# Patient Record
Sex: FEMALE | Race: WHITE | NOT HISPANIC OR LATINO | Employment: FULL TIME
[De-identification: names, ages, dates, MRNs, and addresses within clinical notes are randomized per-mention and may not be internally consistent; named-entity substitution may affect disease eponyms.]

---

## 2018-08-31 ENCOUNTER — TRANSCRIBE ORDERS (OUTPATIENT)
Dept: SCHEDULING | Age: 58
End: 2018-08-31

## 2018-08-31 DIAGNOSIS — Z12.31 ENCOUNTER FOR SCREENING MAMMOGRAM FOR MALIGNANT NEOPLASM OF BREAST: Primary | ICD-10-CM

## 2018-09-06 ENCOUNTER — HOSPITAL ENCOUNTER (OUTPATIENT)
Dept: RADIOLOGY | Age: 58
Discharge: HOME | End: 2018-09-06
Attending: OBSTETRICS & GYNECOLOGY
Payer: COMMERCIAL

## 2018-09-06 DIAGNOSIS — Z12.31 ENCOUNTER FOR SCREENING MAMMOGRAM FOR MALIGNANT NEOPLASM OF BREAST: ICD-10-CM

## 2018-09-06 PROCEDURE — 77063 BREAST TOMOSYNTHESIS BI: CPT

## 2019-08-07 ENCOUNTER — TRANSCRIBE ORDERS (OUTPATIENT)
Dept: SCHEDULING | Age: 59
End: 2019-08-07

## 2019-08-07 DIAGNOSIS — Z13.820 ENCOUNTER FOR SCREENING FOR OSTEOPOROSIS: Primary | ICD-10-CM

## 2019-08-09 ENCOUNTER — TRANSCRIBE ORDERS (OUTPATIENT)
Dept: SCHEDULING | Age: 59
End: 2019-08-09

## 2019-08-09 DIAGNOSIS — Z12.31 ENCOUNTER FOR SCREENING MAMMOGRAM FOR MALIGNANT NEOPLASM OF BREAST: Primary | ICD-10-CM

## 2019-09-09 ENCOUNTER — HOSPITAL ENCOUNTER (OUTPATIENT)
Dept: RADIOLOGY | Age: 59
Discharge: HOME | End: 2019-09-09
Attending: FAMILY MEDICINE
Payer: COMMERCIAL

## 2019-09-09 ENCOUNTER — HOSPITAL ENCOUNTER (OUTPATIENT)
Dept: RADIOLOGY | Age: 59
Discharge: HOME | End: 2019-09-09
Attending: OBSTETRICS & GYNECOLOGY
Payer: COMMERCIAL

## 2019-09-09 DIAGNOSIS — Z12.31 ENCOUNTER FOR SCREENING MAMMOGRAM FOR MALIGNANT NEOPLASM OF BREAST: ICD-10-CM

## 2019-09-09 DIAGNOSIS — Z13.820 ENCOUNTER FOR SCREENING FOR OSTEOPOROSIS: ICD-10-CM

## 2019-09-09 PROCEDURE — 77080 DXA BONE DENSITY AXIAL: CPT

## 2019-09-09 PROCEDURE — 77067 SCR MAMMO BI INCL CAD: CPT

## 2021-12-23 ENCOUNTER — HOSPITAL ENCOUNTER (OUTPATIENT)
Dept: RADIOLOGY | Age: 61
Discharge: HOME | End: 2021-12-23
Attending: NURSE PRACTITIONER
Payer: COMMERCIAL

## 2021-12-23 ENCOUNTER — TRANSCRIBE ORDERS (OUTPATIENT)
Dept: REGISTRATION | Age: 61
End: 2021-12-23

## 2021-12-23 DIAGNOSIS — Z12.39 ENCOUNTER FOR OTHER SCREENING FOR MALIGNANT NEOPLASM OF BREAST: ICD-10-CM

## 2021-12-23 DIAGNOSIS — Z12.39 ENCOUNTER FOR OTHER SCREENING FOR MALIGNANT NEOPLASM OF BREAST: Primary | ICD-10-CM

## 2021-12-23 PROCEDURE — 77067 SCR MAMMO BI INCL CAD: CPT

## 2021-12-27 ENCOUNTER — TRANSCRIBE ORDERS (OUTPATIENT)
Dept: REGISTRATION | Age: 61
End: 2021-12-27

## 2021-12-27 DIAGNOSIS — R92.8 OTHER ABNORMAL AND INCONCLUSIVE FINDINGS ON DIAGNOSTIC IMAGING OF BREAST: Primary | ICD-10-CM

## 2022-01-14 ENCOUNTER — HOSPITAL ENCOUNTER (OUTPATIENT)
Dept: RADIOLOGY | Age: 62
Discharge: HOME | End: 2022-01-14
Attending: STUDENT IN AN ORGANIZED HEALTH CARE EDUCATION/TRAINING PROGRAM
Payer: COMMERCIAL

## 2022-01-14 DIAGNOSIS — R92.8 OTHER ABNORMAL AND INCONCLUSIVE FINDINGS ON DIAGNOSTIC IMAGING OF BREAST: ICD-10-CM

## 2022-01-14 PROCEDURE — 77065 DX MAMMO INCL CAD UNI: CPT | Mod: RT

## 2022-01-14 PROCEDURE — 76642 ULTRASOUND BREAST LIMITED: CPT | Mod: LT

## 2023-03-08 ENCOUNTER — HOSPITAL ENCOUNTER (OUTPATIENT)
Dept: RADIOLOGY | Facility: CLINIC | Age: 63
Discharge: HOME | End: 2023-03-08
Attending: NURSE PRACTITIONER
Payer: COMMERCIAL

## 2023-03-08 ENCOUNTER — TRANSCRIBE ORDERS (OUTPATIENT)
Dept: REGISTRATION | Facility: CLINIC | Age: 63
End: 2023-03-08

## 2023-03-08 DIAGNOSIS — Z00.00 ENCOUNTER FOR GENERAL ADULT MEDICAL EXAMINATION WITHOUT ABNORMAL FINDINGS: ICD-10-CM

## 2023-03-08 DIAGNOSIS — Z12.39 ENCOUNTER FOR OTHER SCREENING FOR MALIGNANT NEOPLASM OF BREAST: ICD-10-CM

## 2023-03-08 DIAGNOSIS — Z12.39 ENCOUNTER FOR OTHER SCREENING FOR MALIGNANT NEOPLASM OF BREAST: Primary | ICD-10-CM

## 2023-03-08 DIAGNOSIS — N95.1 MENOPAUSAL AND FEMALE CLIMACTERIC STATES: ICD-10-CM

## 2023-03-08 PROCEDURE — 77063 BREAST TOMOSYNTHESIS BI: CPT

## 2023-03-08 PROCEDURE — 77067 SCR MAMMO BI INCL CAD: CPT

## 2023-03-08 PROCEDURE — 77080 DXA BONE DENSITY AXIAL: CPT

## 2023-03-09 ENCOUNTER — TRANSCRIBE ORDERS (OUTPATIENT)
Dept: SCHEDULING | Age: 63
End: 2023-03-09

## 2023-03-09 DIAGNOSIS — R79.89 OTHER SPECIFIED ABNORMAL FINDINGS OF BLOOD CHEMISTRY: Primary | ICD-10-CM

## 2023-03-16 ENCOUNTER — HOSPITAL ENCOUNTER (OUTPATIENT)
Dept: RADIOLOGY | Facility: CLINIC | Age: 63
Discharge: HOME | End: 2023-03-16
Attending: NURSE PRACTITIONER
Payer: COMMERCIAL

## 2023-03-16 DIAGNOSIS — R79.89 OTHER SPECIFIED ABNORMAL FINDINGS OF BLOOD CHEMISTRY: ICD-10-CM

## 2023-03-16 PROCEDURE — 76700 US EXAM ABDOM COMPLETE: CPT

## 2023-04-04 ENCOUNTER — APPOINTMENT (RX ONLY)
Dept: URBAN - METROPOLITAN AREA CLINIC 374 | Facility: CLINIC | Age: 63
Setting detail: DERMATOLOGY
End: 2023-04-04

## 2023-04-04 DIAGNOSIS — B07.8 OTHER VIRAL WARTS: ICD-10-CM

## 2023-04-04 PROCEDURE — ? BENIGN DESTRUCTION

## 2023-04-04 PROCEDURE — 17110 DESTRUCTION B9 LES UP TO 14: CPT

## 2023-04-04 PROCEDURE — ? COUNSELING

## 2023-04-04 ASSESSMENT — LOCATION SIMPLE DESCRIPTION DERM
LOCATION SIMPLE: LEFT FOREHEAD
LOCATION SIMPLE: NOSE

## 2023-04-04 ASSESSMENT — LOCATION DETAILED DESCRIPTION DERM
LOCATION DETAILED: LEFT MEDIAL FOREHEAD
LOCATION DETAILED: NASAL DORSUM

## 2023-04-04 ASSESSMENT — LOCATION ZONE DERM
LOCATION ZONE: FACE
LOCATION ZONE: NOSE

## 2023-04-04 NOTE — PROCEDURE: BENIGN DESTRUCTION
Add 52 Modifier (Optional): no
Detail Level: Detailed
Treatment Number (Will Not Render If 0): 1
Anesthesia Volume In Cc: 0
Number Of Freeze-Thaw Cycles: 3 freeze-thaw cycles
Medical Necessity Clause: This procedure was medically necessary because the lesions that were treated were:
Post-Care Instructions: I reviewed with the patient in detail post-care instructions. Patient is to wear sunprotection, and avoid picking at any of the treated lesions. Pt may apply Vaseline to crusted or scabbing areas.
Medical Necessity Information: It is in your best interest to select a reason for this procedure from the list below. All of these items fulfill various CMS LCD requirements except the new and changing color options.
Consent: The patient's verbal consent was obtained including but not limited to risks of crusting, scabbing, blistering, scarring, darker or lighter pigmentary change, recurrence, incomplete removal and infection.

## 2023-04-21 ENCOUNTER — TRANSCRIBE ORDERS (OUTPATIENT)
Dept: SCHEDULING | Age: 63
End: 2023-04-21

## 2023-04-21 DIAGNOSIS — M23.92 UNSPECIFIED INTERNAL DERANGEMENT OF LEFT KNEE: Primary | ICD-10-CM

## 2023-04-22 ENCOUNTER — HOSPITAL ENCOUNTER (OUTPATIENT)
Dept: RADIOLOGY | Age: 63
Discharge: HOME | End: 2023-04-22
Attending: ORTHOPAEDIC SURGERY
Payer: COMMERCIAL

## 2023-04-22 DIAGNOSIS — M23.92 UNSPECIFIED INTERNAL DERANGEMENT OF LEFT KNEE: ICD-10-CM

## 2024-05-03 ENCOUNTER — PRE-ADMISSION TESTING (OUTPATIENT)
Dept: PREADMISSION TESTING | Age: 64
End: 2024-05-03
Payer: COMMERCIAL

## 2024-05-03 VITALS — HEIGHT: 65 IN | WEIGHT: 188 LBS | BODY MASS INDEX: 31.32 KG/M2

## 2024-05-03 RX ORDER — ZOLPIDEM TARTRATE 10 MG/1
10 TABLET ORAL NIGHTLY
Status: ON HOLD | COMMUNITY
End: 2024-05-16

## 2024-05-03 RX ORDER — TIRZEPATIDE 7.5 MG/.5ML
INJECTION, SOLUTION SUBCUTANEOUS
COMMUNITY
Start: 2024-04-30

## 2024-05-03 RX ORDER — CHOLECALCIFEROL (VITAMIN D3) 50 MCG
2000 TABLET ORAL EVERY EVENING
COMMUNITY

## 2024-05-03 RX ORDER — ROSUVASTATIN CALCIUM 10 MG/1
10 TABLET, COATED ORAL NIGHTLY
COMMUNITY

## 2024-05-03 NOTE — PRE-PROCEDURE INSTRUCTIONS
1.       Admissions will call you with your arrival time on  May 14, 2024 (day prior to surgery) between 2pm-4pm.  For questions about your arrival time, please call [phone].    2.       On the day of your procedure please report to the [facility] in the [place]. Please arrive through the [facility].  If you are parking in the [facility], come to the ground floor of the garage and follow signs to the [facility].  After being screened, please report to either the Outpatient Registration for Pre-Admission Testing or to the Surgery Registration Desk.    3. Please follow the following fasting guidelines:     No solid food EIGHT HOURS prior to arrival time on day of surgery.  6 ounces of clear liquids, meaning water or PLAIN black coffee WITHOUT any milk, cream, sugar, or sweetener are permitted up to TWO HOURS prior to arrival at the hospital.    4. Please take ONLY the following medications with a sip of water on the morning of your procedure:   none    5. Other Instructions: You may brush your teeth the morning of the procedure. Rinse and spit, do not swallow.  Bring a list of your medications with dosages.  Use surgical wash as directed.     6. If you develop a cold, cough, fever, rash, or other symptom prior to the day of the procedure, please report it to your physician immediately.    7. If you need to cancel the procedure for any reason, please contact your physician.    8. Make arrangements to have safe transportation home accompanied by a responsible adult. If you have not arranged safe transportation home, your surgery will be cancelled. Safe transportation may include private vehicle, ride-share service, taxi and public transportation when accompanied by a responsible adult who will assist you home. A responsible adult is someone known to you and does not include the taxi, ride-share or public transit drive transporting you.    9.  If it is medically necessary  for you to have a longer stay, you will be informed as soon as the decision is made.    10. Only bring essential items to the hospital.  Do not wear or bring anything of value to the hospital including jewelry of any kind, money, or wallet. Do not wear make-up or contact lenses.  DO NOT BRING MEDICATIONS FROM HOME unless instructed to do so. DO bring your hearing aids, glasses, and a case    11. No lotion, creams, powders, or oils on skin the morning of procedure     12. Dress in comfortable clothes.    13.  If instructed, please bring a copy of your Advanced Directive (Living Will/Durable Power of ) on the day of your procedure.     14. Ensuring your safety at all times is a very important part of our [facility] Culture of Safety. After having surgery and sedation, you are at risk for falling and balance issues. Although you may feel awake, the effects of the medication can last up to 24 hours after anesthesia. If you need to use the bathroom during your recovery period, nursing staff will escort you there and stay with you to ensure your safety.    15. Refrain from drinking alcohol and smoking cigarettes for 24 hours prior to surgery.    16. Shower with antibacterial soap (Dial) the night before and morning of your procedure.  If your procedure indicates the need for CHG antiseptic wash (Bactoshield or Hibiclens), please use this instead and follow instructions as discussed at the time of your Pre-Admission Testing phone interview or visit.    Above instructions reviewed with patient and patient acknowledges understanding.    Form explained by: Pratima Pike RN

## 2024-05-06 ENCOUNTER — TRANSCRIBE ORDERS (OUTPATIENT)
Dept: PREADMISSION TESTING | Facility: HOSPITAL | Age: 64
End: 2024-05-06

## 2024-05-06 DIAGNOSIS — M17.12 UNILATERAL PRIMARY OSTEOARTHRITIS, LEFT KNEE: Primary | ICD-10-CM

## 2024-05-07 ENCOUNTER — HOSPITAL ENCOUNTER (OUTPATIENT)
Dept: CARDIOLOGY | Facility: HOSPITAL | Age: 64
Discharge: HOME | End: 2024-05-07
Attending: ORTHOPAEDIC SURGERY
Payer: COMMERCIAL

## 2024-05-07 ENCOUNTER — PRE-ADMISSION TESTING (OUTPATIENT)
Dept: PREADMISSION TESTING | Facility: HOSPITAL | Age: 64
End: 2024-05-07
Attending: ORTHOPAEDIC SURGERY
Payer: COMMERCIAL

## 2024-05-07 VITALS
RESPIRATION RATE: 16 BRPM | DIASTOLIC BLOOD PRESSURE: 84 MMHG | WEIGHT: 184.4 LBS | OXYGEN SATURATION: 97 % | HEART RATE: 72 BPM | SYSTOLIC BLOOD PRESSURE: 126 MMHG | TEMPERATURE: 97.7 F | HEIGHT: 65 IN | BODY MASS INDEX: 30.72 KG/M2

## 2024-05-07 DIAGNOSIS — M17.12 UNILATERAL PRIMARY OSTEOARTHRITIS, LEFT KNEE: ICD-10-CM

## 2024-05-07 DIAGNOSIS — E78.5 HYPERLIPIDEMIA, UNSPECIFIED HYPERLIPIDEMIA TYPE: ICD-10-CM

## 2024-05-07 DIAGNOSIS — Z01.818 ENCOUNTER FOR PRE-OPERATIVE EXAMINATION: Primary | ICD-10-CM

## 2024-05-07 DIAGNOSIS — R63.4 WEIGHT LOSS: ICD-10-CM

## 2024-05-07 DIAGNOSIS — M17.12 PRIMARY OSTEOARTHRITIS OF LEFT KNEE: ICD-10-CM

## 2024-05-07 LAB
ABO + RH BLD: NORMAL
ANION GAP SERPL CALC-SCNC: 8 MEQ/L (ref 3–15)
BLD GP AB SCN SERPL QL: NEGATIVE
BLOOD BANK CMNT PATIENT-IMP: NORMAL
BUN SERPL-MCNC: 17 MG/DL (ref 7–25)
CALCIUM SERPL-MCNC: 9.8 MG/DL (ref 8.6–10.3)
CHLORIDE SERPL-SCNC: 104 MEQ/L (ref 98–107)
CO2 SERPL-SCNC: 27 MEQ/L (ref 21–31)
CREAT SERPL-MCNC: 0.8 MG/DL (ref 0.6–1.2)
D AG BLD QL: POSITIVE
EGFRCR SERPLBLD CKD-EPI 2021: >60 ML/MIN/1.73M*2
ERYTHROCYTE [DISTWIDTH] IN BLOOD BY AUTOMATED COUNT: 11.7 % (ref 11.7–14.4)
GLUCOSE SERPL-MCNC: 77 MG/DL (ref 70–99)
HCT VFR BLD AUTO: 42.3 % (ref 35–45)
HGB BLD-MCNC: 14 G/DL (ref 11.8–15.7)
LABORATORY COMMENT REPORT: NORMAL
MCH RBC QN AUTO: 30.2 PG (ref 28–33.2)
MCHC RBC AUTO-ENTMCNC: 33.1 G/DL (ref 32.2–35.5)
MCV RBC AUTO: 91.4 FL (ref 83–98)
PDW BLD AUTO: 11 FL (ref 9.4–12.3)
PLATELET # BLD AUTO: 247 K/UL (ref 150–369)
POTASSIUM SERPL-SCNC: 4.3 MEQ/L (ref 3.5–5.1)
RBC # BLD AUTO: 4.63 M/UL (ref 3.93–5.22)
SODIUM SERPL-SCNC: 139 MEQ/L (ref 136–145)
SPECIMEN EXP DATE BLD: NORMAL
WBC # BLD AUTO: 4.12 K/UL (ref 3.8–10.5)

## 2024-05-07 PROCEDURE — 93005 ELECTROCARDIOGRAM TRACING: CPT

## 2024-05-07 PROCEDURE — 99203 OFFICE O/P NEW LOW 30 MIN: CPT | Performed by: HOSPITALIST

## 2024-05-07 PROCEDURE — 86901 BLOOD TYPING SEROLOGIC RH(D): CPT

## 2024-05-07 PROCEDURE — 36415 COLL VENOUS BLD VENIPUNCTURE: CPT

## 2024-05-07 PROCEDURE — 80048 BASIC METABOLIC PNL TOTAL CA: CPT

## 2024-05-07 PROCEDURE — 85027 COMPLETE CBC AUTOMATED: CPT

## 2024-05-07 NOTE — ASSESSMENT & PLAN NOTE
Scheduled to have left knee arthroplasty by Dr Cannon on 5/15/24  Complaining of left knee pain for the last 3 YEARS and worse for the last YEAR  She tried all nonoperative measures without much improvement  She denies any exertional chest pain or sob and her exercise tolerance is > 4 mets  She denies any history of cad/mi/stroke  She denies any bowel or bladder disturbance  She denies any history of dvt/pe  She denies any snoring   EKG shows normal sinus rhythm with no ST changes

## 2024-05-07 NOTE — H&P (VIEW-ONLY)
[facility] Medicine Service -  Pre-Operative Consultation       Patient Name: Maral Nieves  Referring Surgeon: Dr Cannon    Reason for Referral: Pre-Operative Evaluation  Surgical Procedure: Left Knee Arthroplasty  Operative Date: 5/15/24  Other Providers:      PCP: Frankel, Harry A, MD          HISTORY OF PRESENT ILLNESS      Maral Nieves is a 64 y.o. female presenting today to the [facility] at [place] for pre-operative evaluation prior to planned surgery.    Complaining of left knee pain for the last 3 YEARS and worse for the last YEAR  She tried all nonoperative measures without much improvement  She denies any exertional chest pain or sob and her exercise tolerance is > 4 mets  She denies any history of cad/mi/stroke    In regards to medical history:  Hyperlipidemia    The patient denies any current or recent chest pain or pressure, dyspnea, cough, sputum, fevers, chills, abdominal pain, nausea, vomiting, diarrhea or other symptoms.     Functionally, the patient is able to ascend a flight or so of stairs with no dyspnea or chest pain.     The patient denies, on specific questioning, the following:  No history of MI, arrhythmia,or CHF.  No history of SON.  No history of DVT/PE.  No history of COPD.  No history of CVA.  No history of DM.   No history of CKD.     PAST MEDICAL AND SURGICAL HISTORY      Past Medical History:   Diagnosis Date    Family history of bleeding or clotting disorder     mother and sister have factor 5 Leiden/ patient tested negative    Herniated disc, cervical     Lipid disorder     Lumbar herniated disc     OA (osteoarthritis)     knees, feet, neck, back and hands    Seasonal allergies        Past Surgical History:   Procedure Laterality Date    HYSTERECTOMY      KNEE ARTHROSCOPY Right     OTHER SURGICAL HISTORY      I&D pilonidal cyst    SEPTOPLASTY      TONSILLECTOMY      TUBAL LIGATION         MEDICATIONS        Current  "Outpatient Medications:     cholecalciferol, vitamin D3, (VITAMIN D3) 50 mcg (2,000 unit) tablet, Take 2,000 Units by mouth every evening., Disp: , Rfl:     MOUNJARO pen injector, Starting Sunday after surgery, Disp: , Rfl:     rosuvastatin (CRESTOR) 10 mg tablet, Take 10 mg by mouth nightly., Disp: , Rfl:     zolpidem (AMBIEN) 10 mg tablet, Take 10 mg by mouth nightly., Disp: , Rfl:     ALLERGIES      Patient has no known allergies.    FAMILY HISTORY      family history includes Breast cancer in her cousin and mother's sister; Colon cancer in her biological sister; Lung cancer in her biological sister; Parkinsonism in her biological father.    Denies any prior known family history of DVTs/PEs/clotting disorder    SOCIAL HISTORY      Social History     Tobacco Use    Smoking status: Never    Smokeless tobacco: Never   Vaping Use    Vaping Use: Never used   Substance Use Topics    Alcohol use: Not Currently    Drug use: Never       REVIEW OF SYSTEMS      All other systems reviewed and negative except as noted in HPI    PHYSICAL EXAMINATION      Visit Vitals  /84 (BP Location: Left upper arm, Patient Position: Sitting)   Pulse 72   Temp 36.5 °C (97.7 °F) (Temporal)   Resp 16   Ht 1.651 m (5' 5\")   Wt 83.6 kg (184 lb 6.4 oz)   SpO2 97%   BMI 30.69 kg/m²     Body mass index is 30.69 kg/m².    Physical Exam  General appearance: alert, appears stated age, cooperative, non-toxic  Head: normocephalic, without obvious abnormality, atraumatic  Eyes: conjunctivae clear. PERRL, EOMI's intact.  Lungs: clear to auscultation bilaterally   Heart: regular rate and rhythm, S1, S2 normal,   Abdomen: Nondistended, +BS, soft, non-tender, no masses palpable   Extremities: left knee rom limited secondary to pain  Pulses: 2+ and symmetric B/L DP  Neurologic: Alert and oriented X 3, no focal deficits  Skin: intact, no rashes or lesions     LABS / EKG        Labs      Lab Results   Component Value Date     05/07/2024    K 4.3 " 05/07/2024     05/07/2024    BUN 17 05/07/2024    CREATININE 0.8 05/07/2024    WBC 4.12 05/07/2024    HGB 14.0 05/07/2024    HCT 42.3 05/07/2024     05/07/2024         ECG/Telemetry  I have independently reviewed the ECG. No significant findings.    ASSESSMENT AND PLAN         Encounter for pre-operative examination  Scheduled to have left knee arthroplasty by Dr Cannon on 5/15/24  Complaining of left knee pain for the last 3 YEARS and worse for the last YEAR  She tried all nonoperative measures without much improvement  She denies any exertional chest pain or sob and her exercise tolerance is > 4 mets  She denies any history of cad/mi/stroke  She denies any bowel or bladder disturbance  She denies any history of dvt/pe  She denies any snoring   EKG shows normal sinus rhythm with no ST changes    Hyperlipidemia  Continue Crestor    Weight loss  Patient is on Monjouro which she is going to hold for surgery    Primary osteoarthritis of left knee  Scheduled for knee arthroplasty     In regards to perioperative cardiac risk:  Regarding perioperative cardiovascular risk:  The patient has the following risk factors: none.  This correlates to a rCRI score of 0 with perioperative cardiac event risk of 0.4%.  Knee arthroplasty is an intermediate risk procedure and she is at acceptable risk for surgery    Further comments:  Resume supplements when OK with surgical team.  I would encourage incentive spirometry to assist with minimizing shabbir-operative pulmonary risk.  DVT prophylaxis and timing of such per the discretion of the surgeon.     Please do not hesitate to contact [facility] during the upcoming hospitalization with any questions or concerns.     Gaston Santos MD  5/7/2024

## 2024-05-07 NOTE — CONSULTS
[facility] Medicine Service -  Pre-Operative Consultation       Patient Name: Maral Nieves  Referring Surgeon: Dr Cannon    Reason for Referral: Pre-Operative Evaluation  Surgical Procedure: Left Knee Arthroplasty  Operative Date: 5/15/24  Other Providers:      PCP: Frankel, Harry A, MD          HISTORY OF PRESENT ILLNESS      Maral Nieves is a 64 y.o. female presenting today to the [facility] at [place] for pre-operative evaluation prior to planned surgery.    Complaining of left knee pain for the last 3 YEARS and worse for the last YEAR  She tried all nonoperative measures without much improvement  She denies any exertional chest pain or sob and her exercise tolerance is > 4 mets  She denies any history of cad/mi/stroke    In regards to medical history:  Hyperlipidemia    The patient denies any current or recent chest pain or pressure, dyspnea, cough, sputum, fevers, chills, abdominal pain, nausea, vomiting, diarrhea or other symptoms.     Functionally, the patient is able to ascend a flight or so of stairs with no dyspnea or chest pain.     The patient denies, on specific questioning, the following:  No history of MI, arrhythmia,or CHF.  No history of SON.  No history of DVT/PE.  No history of COPD.  No history of CVA.  No history of DM.   No history of CKD.     PAST MEDICAL AND SURGICAL HISTORY      Past Medical History:   Diagnosis Date    Family history of bleeding or clotting disorder     mother and sister have factor 5 Leiden/ patient tested negative    Herniated disc, cervical     Lipid disorder     Lumbar herniated disc     OA (osteoarthritis)     knees, feet, neck, back and hands    Seasonal allergies        Past Surgical History:   Procedure Laterality Date    HYSTERECTOMY      KNEE ARTHROSCOPY Right     OTHER SURGICAL HISTORY      I&D pilonidal cyst    SEPTOPLASTY      TONSILLECTOMY      TUBAL LIGATION         MEDICATIONS        Current  "Outpatient Medications:     cholecalciferol, vitamin D3, (VITAMIN D3) 50 mcg (2,000 unit) tablet, Take 2,000 Units by mouth every evening., Disp: , Rfl:     MOUNJARO pen injector, Starting Sunday after surgery, Disp: , Rfl:     rosuvastatin (CRESTOR) 10 mg tablet, Take 10 mg by mouth nightly., Disp: , Rfl:     zolpidem (AMBIEN) 10 mg tablet, Take 10 mg by mouth nightly., Disp: , Rfl:     ALLERGIES      Patient has no known allergies.    FAMILY HISTORY      family history includes Breast cancer in her cousin and mother's sister; Colon cancer in her biological sister; Lung cancer in her biological sister; Parkinsonism in her biological father.    Denies any prior known family history of DVTs/PEs/clotting disorder    SOCIAL HISTORY      Social History     Tobacco Use    Smoking status: Never    Smokeless tobacco: Never   Vaping Use    Vaping Use: Never used   Substance Use Topics    Alcohol use: Not Currently    Drug use: Never       REVIEW OF SYSTEMS      All other systems reviewed and negative except as noted in HPI    PHYSICAL EXAMINATION      Visit Vitals  /84 (BP Location: Left upper arm, Patient Position: Sitting)   Pulse 72   Temp 36.5 °C (97.7 °F) (Temporal)   Resp 16   Ht 1.651 m (5' 5\")   Wt 83.6 kg (184 lb 6.4 oz)   SpO2 97%   BMI 30.69 kg/m²     Body mass index is 30.69 kg/m².    Physical Exam  General appearance: alert, appears stated age, cooperative, non-toxic  Head: normocephalic, without obvious abnormality, atraumatic  Eyes: conjunctivae clear. PERRL, EOMI's intact.  Lungs: clear to auscultation bilaterally   Heart: regular rate and rhythm, S1, S2 normal,   Abdomen: Nondistended, +BS, soft, non-tender, no masses palpable   Extremities: left knee rom limited secondary to pain  Pulses: 2+ and symmetric B/L DP  Neurologic: Alert and oriented X 3, no focal deficits  Skin: intact, no rashes or lesions     LABS / EKG        Labs      Lab Results   Component Value Date     05/07/2024    K 4.3 " 05/07/2024     05/07/2024    BUN 17 05/07/2024    CREATININE 0.8 05/07/2024    WBC 4.12 05/07/2024    HGB 14.0 05/07/2024    HCT 42.3 05/07/2024     05/07/2024         ECG/Telemetry  I have independently reviewed the ECG. No significant findings.    ASSESSMENT AND PLAN         Encounter for pre-operative examination  Scheduled to have left knee arthroplasty by Dr Cannon on 5/15/24  Complaining of left knee pain for the last 3 YEARS and worse for the last YEAR  She tried all nonoperative measures without much improvement  She denies any exertional chest pain or sob and her exercise tolerance is > 4 mets  She denies any history of cad/mi/stroke  She denies any bowel or bladder disturbance  She denies any history of dvt/pe  She denies any snoring   EKG shows normal sinus rhythm with no ST changes    Hyperlipidemia  Continue Crestor    Weight loss  Patient is on Monjouro which she is going to hold for surgery    Primary osteoarthritis of left knee  Scheduled for knee arthroplasty     In regards to perioperative cardiac risk:  Regarding perioperative cardiovascular risk:  The patient has the following risk factors: none.  This correlates to a rCRI score of 0 with perioperative cardiac event risk of 0.4%.  Knee arthroplasty is an intermediate risk procedure and she is at acceptable risk for surgery    Further comments:  Resume supplements when OK with surgical team.  I would encourage incentive spirometry to assist with minimizing shabbir-operative pulmonary risk.  DVT prophylaxis and timing of such per the discretion of the surgeon.     Please do not hesitate to contact [facility] during the upcoming hospitalization with any questions or concerns.     Gaston Santos MD  5/7/2024

## 2024-05-08 LAB
ATRIAL RATE: 79
P AXIS: 59
PR INTERVAL: 166
QRS DURATION: 86
QT INTERVAL: 380
QTC CALCULATION(BAZETT): 435
R AXIS: 21
T WAVE AXIS: 36
VENTRICULAR RATE: 79

## 2024-05-10 NOTE — DISCHARGE INSTRUCTIONS
Please see Dr. Cannon's instructions for further information on recovery.       DVT Prophylaxis:   -Continue with Aspirin 81 mg by mouth twice daily X 4 weeks for blood clot prevention.      Constipation/Pain Medication:   -Take your pain medication with food. Do not drive while taking pain medication.   -Pain medications may cause constipation.   -If you have not had a bowel movement in 3 days please call the office   - Please take Senna-Colace as prescribed. Hold for loose stool. If you are having difficulties having a bowel   movement or haven't had bowel movement in 2 days, please add over the counter miralax and take as directed on package.   -Drink plenty of fluids and eat fresh fruits and vegetables.   -DO NOT SMOKE! Smoking is not healthy for your healing process.

## 2024-05-15 ENCOUNTER — ANESTHESIA EVENT (OUTPATIENT)
Dept: OPERATING ROOM | Facility: HOSPITAL | Age: 64
Setting detail: HOSPITAL OUTPATIENT SURGERY
End: 2024-05-15
Payer: COMMERCIAL

## 2024-05-15 ENCOUNTER — APPOINTMENT (OUTPATIENT)
Dept: RADIOLOGY | Facility: HOSPITAL | Age: 64
End: 2024-05-15
Attending: NURSE PRACTITIONER
Payer: COMMERCIAL

## 2024-05-15 ENCOUNTER — HOSPITAL ENCOUNTER (OUTPATIENT)
Facility: HOSPITAL | Age: 64
Discharge: HOME | End: 2024-05-16
Attending: ORTHOPAEDIC SURGERY | Admitting: ORTHOPAEDIC SURGERY
Payer: COMMERCIAL

## 2024-05-15 PROBLEM — Z96.652 STATUS POST TOTAL LEFT KNEE REPLACEMENT: Status: ACTIVE | Noted: 2024-05-15

## 2024-05-15 LAB
ABO + RH BLD: NORMAL
D AG BLD QL: POSITIVE
LABORATORY COMMENT REPORT: NORMAL

## 2024-05-15 PROCEDURE — 73560 X-RAY EXAM OF KNEE 1 OR 2: CPT | Mod: LT

## 2024-05-15 PROCEDURE — 99232 SBSQ HOSP IP/OBS MODERATE 35: CPT | Performed by: HOSPITALIST

## 2024-05-15 PROCEDURE — 36415 COLL VENOUS BLD VENIPUNCTURE: CPT | Performed by: ORTHOPAEDIC SURGERY

## 2024-05-15 PROCEDURE — 63600000 HC DRUGS/DETAIL CODE: Mod: JZ | Performed by: ORTHOPAEDIC SURGERY

## 2024-05-15 PROCEDURE — 25000000 HC PHARMACY GENERAL: Performed by: PHYSICIAN ASSISTANT

## 2024-05-15 PROCEDURE — 63600000 HC DRUGS/DETAIL CODE: Mod: JZ

## 2024-05-15 PROCEDURE — 25800000 HC PHARMACY IV SOLUTIONS: Performed by: PHYSICIAN ASSISTANT

## 2024-05-15 PROCEDURE — 63700000 HC SELF-ADMINISTRABLE DRUG: Performed by: ORTHOPAEDIC SURGERY

## 2024-05-15 PROCEDURE — 25800000 HC PHARMACY IV SOLUTIONS: Performed by: NURSE PRACTITIONER

## 2024-05-15 PROCEDURE — 36000016 HC OR LEVEL 6 EA ADDL MIN: Performed by: ORTHOPAEDIC SURGERY

## 2024-05-15 PROCEDURE — C1776 JOINT DEVICE (IMPLANTABLE): HCPCS | Performed by: ORTHOPAEDIC SURGERY

## 2024-05-15 PROCEDURE — 63600000 HC DRUGS/DETAIL CODE: Mod: JZ | Performed by: PHYSICIAN ASSISTANT

## 2024-05-15 PROCEDURE — 63600000 HC DRUGS/DETAIL CODE: Mod: JZ | Performed by: NURSE ANESTHETIST, CERTIFIED REGISTERED

## 2024-05-15 PROCEDURE — 37000010 HC ANESTHESIA SPINAL: Performed by: ORTHOPAEDIC SURGERY

## 2024-05-15 PROCEDURE — 71000011 HC PACU PHASE 1 EA ADDL MIN: Performed by: ORTHOPAEDIC SURGERY

## 2024-05-15 PROCEDURE — 36000006 HC OR LEVEL 6 INITIAL 30MIN: Performed by: ORTHOPAEDIC SURGERY

## 2024-05-15 PROCEDURE — 63700000 HC SELF-ADMINISTRABLE DRUG: Performed by: STUDENT IN AN ORGANIZED HEALTH CARE EDUCATION/TRAINING PROGRAM

## 2024-05-15 PROCEDURE — 63600000 HC DRUGS/DETAIL CODE: Performed by: NURSE PRACTITIONER

## 2024-05-15 PROCEDURE — 63700000 HC SELF-ADMINISTRABLE DRUG

## 2024-05-15 PROCEDURE — 63600000 HC DRUGS/DETAIL CODE: Mod: JW | Performed by: NURSE ANESTHETIST, CERTIFIED REGISTERED

## 2024-05-15 PROCEDURE — 25000000 HC PHARMACY GENERAL: Mod: JW | Performed by: NURSE ANESTHETIST, CERTIFIED REGISTERED

## 2024-05-15 PROCEDURE — 63600000 HC DRUGS/DETAIL CODE: Mod: JZ | Performed by: ANESTHESIOLOGY

## 2024-05-15 PROCEDURE — 27200000 HC STERILE SUPPLY: Performed by: ORTHOPAEDIC SURGERY

## 2024-05-15 PROCEDURE — 63600000 HC DRUGS/DETAIL CODE: Performed by: PHYSICIAN ASSISTANT

## 2024-05-15 PROCEDURE — 25800000 HC PHARMACY IV SOLUTIONS: Performed by: NURSE ANESTHETIST, CERTIFIED REGISTERED

## 2024-05-15 PROCEDURE — 71000001 HC PACU PHASE 1 INITIAL 30MIN: Performed by: ORTHOPAEDIC SURGERY

## 2024-05-15 PROCEDURE — 63700000 HC SELF-ADMINISTRABLE DRUG: Performed by: NURSE PRACTITIONER

## 2024-05-15 PROCEDURE — 0SRD0JA REPLACEMENT OF LEFT KNEE JOINT WITH SYNTHETIC SUBSTITUTE, UNCEMENTED, OPEN APPROACH: ICD-10-PCS | Performed by: ORTHOPAEDIC SURGERY

## 2024-05-15 PROCEDURE — 25800000 HC PHARMACY IV SOLUTIONS

## 2024-05-15 DEVICE — PATELLA
Type: IMPLANTABLE DEVICE | Site: KNEE | Status: FUNCTIONAL
Brand: TRIATHLON

## 2024-05-15 DEVICE — TIBIAL COMPONENT
Type: IMPLANTABLE DEVICE | Site: KNEE | Status: FUNCTIONAL
Brand: TRIATHLON

## 2024-05-15 DEVICE — TIBIAL BEARING INSERT - CS
Type: IMPLANTABLE DEVICE | Site: KNEE | Status: FUNCTIONAL
Brand: TRIATHLON

## 2024-05-15 DEVICE — CRUCIATE RETAINING FEMORAL
Type: IMPLANTABLE DEVICE | Site: KNEE | Status: FUNCTIONAL
Brand: TRIATHLON

## 2024-05-15 RX ORDER — NAPROXEN SODIUM 220 MG/1
81 TABLET, FILM COATED ORAL 2 TIMES DAILY
Status: DISCONTINUED | OUTPATIENT
Start: 2024-05-15 | End: 2024-05-16 | Stop reason: HOSPADM

## 2024-05-15 RX ORDER — ALUMINUM HYDROXIDE, MAGNESIUM HYDROXIDE, AND SIMETHICONE 1200; 120; 1200 MG/30ML; MG/30ML; MG/30ML
30 SUSPENSION ORAL EVERY 4 HOURS PRN
Status: DISCONTINUED | OUTPATIENT
Start: 2024-05-15 | End: 2024-05-16 | Stop reason: HOSPADM

## 2024-05-15 RX ORDER — BISACODYL 10 MG/1
10 SUPPOSITORY RECTAL DAILY PRN
Status: DISCONTINUED | OUTPATIENT
Start: 2024-05-15 | End: 2024-05-16 | Stop reason: HOSPADM

## 2024-05-15 RX ORDER — OXYCODONE HYDROCHLORIDE 5 MG/1
5-10 TABLET ORAL ONCE
Status: COMPLETED | OUTPATIENT
Start: 2024-05-15 | End: 2024-05-15

## 2024-05-15 RX ORDER — DIPHENHYDRAMINE HCL 25 MG
25 CAPSULE ORAL EVERY 6 HOURS PRN
Status: DISCONTINUED | OUTPATIENT
Start: 2024-05-15 | End: 2024-05-16 | Stop reason: HOSPADM

## 2024-05-15 RX ORDER — HYDROMORPHONE HYDROCHLORIDE 1 MG/ML
0.5 INJECTION, SOLUTION INTRAMUSCULAR; INTRAVENOUS; SUBCUTANEOUS
Status: DISCONTINUED | OUTPATIENT
Start: 2024-05-15 | End: 2024-05-15 | Stop reason: HOSPADM

## 2024-05-15 RX ORDER — DIPHENHYDRAMINE HCL 50 MG/ML
25 VIAL (ML) INJECTION EVERY 6 HOURS PRN
Status: DISCONTINUED | OUTPATIENT
Start: 2024-05-15 | End: 2024-05-16 | Stop reason: HOSPADM

## 2024-05-15 RX ORDER — PHENYLEPHRINE HYDROCHLORIDE 10 MG/ML
INJECTION INTRAVENOUS AS NEEDED
Status: DISCONTINUED | OUTPATIENT
Start: 2024-05-15 | End: 2024-05-15 | Stop reason: SURG

## 2024-05-15 RX ORDER — SODIUM CHLORIDE, SODIUM GLUCONATE, SODIUM ACETATE, POTASSIUM CHLORIDE AND MAGNESIUM CHLORIDE 30; 37; 368; 526; 502 MG/100ML; MG/100ML; MG/100ML; MG/100ML; MG/100ML
INJECTION, SOLUTION INTRAVENOUS CONTINUOUS PRN
Status: DISCONTINUED | OUTPATIENT
Start: 2024-05-15 | End: 2024-05-15 | Stop reason: SURG

## 2024-05-15 RX ORDER — SODIUM CHLORIDE 9 MG/ML
INJECTION INTRAMUSCULAR; INTRAVENOUS; SUBCUTANEOUS
Status: DISCONTINUED | OUTPATIENT
Start: 2024-05-15 | End: 2024-05-15 | Stop reason: HOSPADM

## 2024-05-15 RX ORDER — PREGABALIN 75 MG/1
CAPSULE ORAL
Status: COMPLETED
Start: 2024-05-15 | End: 2024-05-15

## 2024-05-15 RX ORDER — VANCOMYCIN HYDROCHLORIDE
15
Status: COMPLETED | OUTPATIENT
Start: 2024-05-15 | End: 2024-05-15

## 2024-05-15 RX ORDER — KETOROLAC TROMETHAMINE 15 MG/ML
15 INJECTION, SOLUTION INTRAMUSCULAR; INTRAVENOUS
Qty: 7 ML | Refills: 0 | Status: DISCONTINUED | OUTPATIENT
Start: 2024-05-15 | End: 2024-05-16 | Stop reason: HOSPADM

## 2024-05-15 RX ORDER — FENTANYL CITRATE 50 UG/ML
50 INJECTION, SOLUTION INTRAMUSCULAR; INTRAVENOUS EVERY 5 MIN PRN
Status: COMPLETED | OUTPATIENT
Start: 2024-05-15 | End: 2024-05-15

## 2024-05-15 RX ORDER — SCOPOLAMINE 1 MG/3D
1 PATCH, EXTENDED RELEASE TRANSDERMAL
Status: DISCONTINUED | OUTPATIENT
Start: 2024-05-15 | End: 2024-05-16 | Stop reason: HOSPADM

## 2024-05-15 RX ORDER — DEXTROSE 40 %
15-30 GEL (GRAM) ORAL AS NEEDED
Status: DISCONTINUED | OUTPATIENT
Start: 2024-05-15 | End: 2024-05-16 | Stop reason: HOSPADM

## 2024-05-15 RX ORDER — HYDROMORPHONE HYDROCHLORIDE 1 MG/ML
INJECTION, SOLUTION INTRAMUSCULAR; INTRAVENOUS; SUBCUTANEOUS AS NEEDED
Status: DISCONTINUED | OUTPATIENT
Start: 2024-05-15 | End: 2024-05-15 | Stop reason: SURG

## 2024-05-15 RX ORDER — FENTANYL CITRATE 50 UG/ML
INJECTION, SOLUTION INTRAMUSCULAR; INTRAVENOUS AS NEEDED
Status: DISCONTINUED | OUTPATIENT
Start: 2024-05-15 | End: 2024-05-15 | Stop reason: SURG

## 2024-05-15 RX ORDER — ACETAMINOPHEN 325 MG/1
650 TABLET ORAL
Status: DISCONTINUED | OUTPATIENT
Start: 2024-05-15 | End: 2024-05-16 | Stop reason: HOSPADM

## 2024-05-15 RX ORDER — ONDANSETRON HYDROCHLORIDE 2 MG/ML
INJECTION, SOLUTION INTRAVENOUS AS NEEDED
Status: DISCONTINUED | OUTPATIENT
Start: 2024-05-15 | End: 2024-05-15 | Stop reason: SURG

## 2024-05-15 RX ORDER — ACETAMINOPHEN 325 MG/1
975 TABLET ORAL ONCE
Status: COMPLETED | OUTPATIENT
Start: 2024-05-15 | End: 2024-05-15

## 2024-05-15 RX ORDER — AMOXICILLIN 250 MG
1 CAPSULE ORAL 2 TIMES DAILY
Status: DISCONTINUED | OUTPATIENT
Start: 2024-05-15 | End: 2024-05-16 | Stop reason: HOSPADM

## 2024-05-15 RX ORDER — MIDAZOLAM HYDROCHLORIDE 2 MG/2ML
INJECTION, SOLUTION INTRAMUSCULAR; INTRAVENOUS AS NEEDED
Status: DISCONTINUED | OUTPATIENT
Start: 2024-05-15 | End: 2024-05-15 | Stop reason: SURG

## 2024-05-15 RX ORDER — SODIUM CHLORIDE 9 MG/ML
INJECTION, SOLUTION INTRAVENOUS CONTINUOUS
Status: ACTIVE | OUTPATIENT
Start: 2024-05-15 | End: 2024-05-16

## 2024-05-15 RX ORDER — SODIUM CHLORIDE 9 MG/ML
INJECTION, SOLUTION INTRAVENOUS CONTINUOUS PRN
Status: DISCONTINUED | OUTPATIENT
Start: 2024-05-15 | End: 2024-05-15 | Stop reason: SURG

## 2024-05-15 RX ORDER — IBUPROFEN 200 MG
16-32 TABLET ORAL AS NEEDED
Status: DISCONTINUED | OUTPATIENT
Start: 2024-05-15 | End: 2024-05-16 | Stop reason: HOSPADM

## 2024-05-15 RX ORDER — ONDANSETRON HYDROCHLORIDE 2 MG/ML
4 INJECTION, SOLUTION INTRAVENOUS
Status: DISCONTINUED | OUTPATIENT
Start: 2024-05-15 | End: 2024-05-15 | Stop reason: HOSPADM

## 2024-05-15 RX ORDER — IBUPROFEN 200 MG
16-32 TABLET ORAL AS NEEDED
Status: DISCONTINUED | OUTPATIENT
Start: 2024-05-15 | End: 2024-05-15 | Stop reason: HOSPADM

## 2024-05-15 RX ORDER — ROPIVACAINE HYDROCHLORIDE 5 MG/ML
INJECTION, SOLUTION EPIDURAL; INFILTRATION; PERINEURAL
Status: DISCONTINUED | OUTPATIENT
Start: 2024-05-15 | End: 2024-05-15 | Stop reason: HOSPADM

## 2024-05-15 RX ORDER — OXYCODONE HYDROCHLORIDE 5 MG/1
5-10 TABLET ORAL EVERY 4 HOURS PRN
Status: DISCONTINUED | OUTPATIENT
Start: 2024-05-15 | End: 2024-05-16 | Stop reason: HOSPADM

## 2024-05-15 RX ORDER — CELECOXIB 200 MG/1
CAPSULE ORAL
Status: COMPLETED
Start: 2024-05-15 | End: 2024-05-15

## 2024-05-15 RX ORDER — ONDANSETRON HYDROCHLORIDE 2 MG/ML
4 INJECTION, SOLUTION INTRAVENOUS EVERY 8 HOURS PRN
Status: DISCONTINUED | OUTPATIENT
Start: 2024-05-15 | End: 2024-05-16 | Stop reason: HOSPADM

## 2024-05-15 RX ORDER — ONDANSETRON 4 MG/1
4 TABLET, ORALLY DISINTEGRATING ORAL EVERY 8 HOURS PRN
Status: DISCONTINUED | OUTPATIENT
Start: 2024-05-15 | End: 2024-05-16 | Stop reason: HOSPADM

## 2024-05-15 RX ORDER — DEXTROSE 50 % IN WATER (D50W) INTRAVENOUS SYRINGE
25 AS NEEDED
Status: DISCONTINUED | OUTPATIENT
Start: 2024-05-15 | End: 2024-05-16 | Stop reason: HOSPADM

## 2024-05-15 RX ORDER — ACETAMINOPHEN 325 MG/1
TABLET ORAL
Status: COMPLETED
Start: 2024-05-15 | End: 2024-05-15

## 2024-05-15 RX ORDER — ROSUVASTATIN CALCIUM 10 MG/1
10 TABLET, COATED ORAL NIGHTLY
Status: DISCONTINUED | OUTPATIENT
Start: 2024-05-15 | End: 2024-05-16 | Stop reason: HOSPADM

## 2024-05-15 RX ORDER — PROPOFOL 10 MG/ML
INJECTION, EMULSION INTRAVENOUS CONTINUOUS PRN
Status: DISCONTINUED | OUTPATIENT
Start: 2024-05-15 | End: 2024-05-15 | Stop reason: SURG

## 2024-05-15 RX ORDER — CELECOXIB 200 MG/1
200 CAPSULE ORAL ONCE
Status: COMPLETED | OUTPATIENT
Start: 2024-05-15 | End: 2024-05-15

## 2024-05-15 RX ORDER — OXYCODONE HYDROCHLORIDE 5 MG/1
5-10 TABLET ORAL ONCE
Status: DISCONTINUED | OUTPATIENT
Start: 2024-05-15 | End: 2024-05-15

## 2024-05-15 RX ORDER — SCOPOLAMINE 1 MG/3D
PATCH, EXTENDED RELEASE TRANSDERMAL
Status: DISCONTINUED
Start: 2024-05-15 | End: 2024-05-16 | Stop reason: HOSPADM

## 2024-05-15 RX ORDER — DEXAMETHASONE SODIUM PHOSPHATE 4 MG/ML
8 INJECTION, SOLUTION INTRA-ARTICULAR; INTRALESIONAL; INTRAMUSCULAR; INTRAVENOUS; SOFT TISSUE ONCE
Status: COMPLETED | OUTPATIENT
Start: 2024-05-16 | End: 2024-05-16

## 2024-05-15 RX ORDER — DEXTROSE 40 %
15-30 GEL (GRAM) ORAL AS NEEDED
Status: DISCONTINUED | OUTPATIENT
Start: 2024-05-15 | End: 2024-05-15 | Stop reason: HOSPADM

## 2024-05-15 RX ORDER — PREGABALIN 75 MG/1
75 CAPSULE ORAL ONCE
Status: COMPLETED | OUTPATIENT
Start: 2024-05-15 | End: 2024-05-15

## 2024-05-15 RX ORDER — POLYETHYLENE GLYCOL 3350 17 G/17G
17 POWDER, FOR SOLUTION ORAL DAILY
Status: DISCONTINUED | OUTPATIENT
Start: 2024-05-15 | End: 2024-05-16 | Stop reason: HOSPADM

## 2024-05-15 RX ORDER — DEXTROSE 50 % IN WATER (D50W) INTRAVENOUS SYRINGE
25 AS NEEDED
Status: DISCONTINUED | OUTPATIENT
Start: 2024-05-15 | End: 2024-05-15 | Stop reason: HOSPADM

## 2024-05-15 RX ORDER — ONDANSETRON HYDROCHLORIDE 2 MG/ML
4 INJECTION, SOLUTION INTRAVENOUS EVERY 6 HOURS PRN
Status: DISCONTINUED | OUTPATIENT
Start: 2024-05-15 | End: 2024-05-15

## 2024-05-15 RX ADMIN — SCOPOLAMINE 1 PATCH: 1 PATCH, EXTENDED RELEASE TRANSDERMAL at 09:28

## 2024-05-15 RX ADMIN — HYDROMORPHONE HYDROCHLORIDE 0.5 MG: 1 INJECTION, SOLUTION INTRAMUSCULAR; INTRAVENOUS; SUBCUTANEOUS at 14:07

## 2024-05-15 RX ADMIN — CEFAZOLIN 2 G: 2 INJECTION, POWDER, FOR SOLUTION INTRAMUSCULAR; INTRAVENOUS at 19:06

## 2024-05-15 RX ADMIN — ACETAMINOPHEN 975 MG: 325 TABLET ORAL at 09:28

## 2024-05-15 RX ADMIN — ASPIRIN 81 MG CHEWABLE TABLET 81 MG: 81 TABLET CHEWABLE at 20:36

## 2024-05-15 RX ADMIN — KETOROLAC TROMETHAMINE 15 MG: 15 INJECTION, SOLUTION INTRAMUSCULAR; INTRAVENOUS at 15:15

## 2024-05-15 RX ADMIN — POLYETHYLENE GLYCOL 3350 17 G: 17 POWDER, FOR SOLUTION ORAL at 16:38

## 2024-05-15 RX ADMIN — CEFAZOLIN 2 G: 2 INJECTION, POWDER, FOR SOLUTION INTRAMUSCULAR; INTRAVENOUS at 10:45

## 2024-05-15 RX ADMIN — HYDROMORPHONE HYDROCHLORIDE 0.5 MG: 1 INJECTION, SOLUTION INTRAMUSCULAR; INTRAVENOUS; SUBCUTANEOUS at 13:00

## 2024-05-15 RX ADMIN — CELECOXIB 200 MG: 200 CAPSULE ORAL at 09:28

## 2024-05-15 RX ADMIN — ACETAMINOPHEN 650 MG: 325 TABLET ORAL at 16:37

## 2024-05-15 RX ADMIN — PHENYLEPHRINE HYDROCHLORIDE 50 MCG: 10 INJECTION INTRAVENOUS at 11:51

## 2024-05-15 RX ADMIN — FENTANYL CITRATE 50 MCG: 50 INJECTION INTRAMUSCULAR; INTRAVENOUS at 11:34

## 2024-05-15 RX ADMIN — SODIUM CHLORIDE: 9 INJECTION, SOLUTION INTRAVENOUS at 16:36

## 2024-05-15 RX ADMIN — PREGABALIN 75 MG: 75 CAPSULE ORAL at 09:28

## 2024-05-15 RX ADMIN — FENTANYL CITRATE 50 MCG: 50 INJECTION, SOLUTION INTRAMUSCULAR; INTRAVENOUS at 12:56

## 2024-05-15 RX ADMIN — FENTANYL CITRATE 50 MCG: 50 INJECTION INTRAMUSCULAR; INTRAVENOUS at 10:31

## 2024-05-15 RX ADMIN — FENTANYL CITRATE 50 MCG: 50 INJECTION, SOLUTION INTRAMUSCULAR; INTRAVENOUS at 12:50

## 2024-05-15 RX ADMIN — HYDROMORPHONE HYDROCHLORIDE 0.5 MG: 1 INJECTION, SOLUTION INTRAMUSCULAR; INTRAVENOUS; SUBCUTANEOUS at 12:19

## 2024-05-15 RX ADMIN — PHENYLEPHRINE HYDROCHLORIDE 50 MCG: 10 INJECTION INTRAVENOUS at 11:48

## 2024-05-15 RX ADMIN — SCOPOLAMINE 1 PATCH: 1.5 PATCH, EXTENDED RELEASE TRANSDERMAL at 09:28

## 2024-05-15 RX ADMIN — HYDROMORPHONE HYDROCHLORIDE 0.5 MG: 1 INJECTION, SOLUTION INTRAMUSCULAR; INTRAVENOUS; SUBCUTANEOUS at 14:49

## 2024-05-15 RX ADMIN — ONDANSETRON 4 MG: 2 INJECTION INTRAMUSCULAR; INTRAVENOUS at 11:37

## 2024-05-15 RX ADMIN — KETOROLAC TROMETHAMINE 15 MG: 15 INJECTION, SOLUTION INTRAMUSCULAR; INTRAVENOUS at 20:36

## 2024-05-15 RX ADMIN — OXYCODONE HYDROCHLORIDE 10 MG: 5 TABLET ORAL at 20:36

## 2024-05-15 RX ADMIN — SODIUM CHLORIDE, SODIUM GLUCONATE, SODIUM ACETATE, POTASSIUM CHLORIDE AND MAGNESIUM CHLORIDE: 526; 502; 368; 37; 30 INJECTION, SOLUTION INTRAVENOUS at 12:31

## 2024-05-15 RX ADMIN — ACETAMINOPHEN 650 MG: 325 TABLET ORAL at 22:31

## 2024-05-15 RX ADMIN — HYDROMORPHONE HYDROCHLORIDE 0.5 MG: 1 INJECTION, SOLUTION INTRAMUSCULAR; INTRAVENOUS; SUBCUTANEOUS at 12:38

## 2024-05-15 RX ADMIN — VANCOMYCIN HYDROCHLORIDE 1250 MG: 500 INJECTION, POWDER, LYOPHILIZED, FOR SOLUTION INTRAVENOUS at 09:29

## 2024-05-15 RX ADMIN — SENNOSIDES AND DOCUSATE SODIUM 1 TABLET: 50; 8.6 TABLET ORAL at 20:35

## 2024-05-15 RX ADMIN — OXYCODONE HYDROCHLORIDE 10 MG: 5 TABLET ORAL at 16:37

## 2024-05-15 RX ADMIN — TRANEXAMIC ACID 1700 MG: 100 INJECTION, SOLUTION INTRAVENOUS at 10:52

## 2024-05-15 RX ADMIN — SODIUM CHLORIDE: 0.9 INJECTION, SOLUTION INTRAVENOUS at 10:27

## 2024-05-15 RX ADMIN — PROPOFOL 100 MCG/KG/MIN: 10 INJECTION, EMULSION INTRAVENOUS at 10:37

## 2024-05-15 RX ADMIN — MIDAZOLAM HYDROCHLORIDE 2 MG: 1 INJECTION, SOLUTION INTRAMUSCULAR; INTRAVENOUS at 10:28

## 2024-05-15 RX ADMIN — ROSUVASTATIN CALCIUM 10 MG: 10 TABLET, FILM COATED ORAL at 22:31

## 2024-05-15 ASSESSMENT — COGNITIVE AND FUNCTIONAL STATUS - GENERAL
CLIMB 3 TO 5 STEPS WITH RAILING: 2 - A LOT
CLIMB 3 TO 5 STEPS WITH RAILING: 2 - A LOT
STANDING UP FROM CHAIR USING ARMS: 2 - A LOT
STANDING UP FROM CHAIR USING ARMS: 2 - A LOT
MOVING TO AND FROM BED TO CHAIR: 2 - A LOT
WALKING IN HOSPITAL ROOM: 2 - A LOT
WALKING IN HOSPITAL ROOM: 2 - A LOT
MOVING TO AND FROM BED TO CHAIR: 2 - A LOT

## 2024-05-15 NOTE — ANESTHESIA PREPROCEDURE EVALUATION
Relevant Problems   MUSCULOSKELETAL   (+) Primary osteoarthritis of left knee       Anesthesia ROS/MED HX    Anesthesia History    History of anesthetic complications  - PONV  Pulmonary - neg  Neuro/Psych - neg  Cardiovascular   dyslipidemia   Cardiac clearance reviewed and ECG reviewed  Hematological - neg  GI/Hepatic- neg  Musculoskeletal   Osteoarthritis  Renal Disease- neg  Endo/Other- neg  Body Habitus: Obese  ROS/MED HX Comments:    ROS/Hx: Narrative      Normal sinus rhythm  Normal ECG  Confirmed by Pilo Egan ([id number]) on 5/8/2024 10:10:48 AM    Specimen Collected: 05/07/24 08:47 Last Resulted: 05/08/24 10:10      Denies other med issues       Past Surgical History:   Procedure Laterality Date    HYSTERECTOMY      KNEE ARTHROSCOPY Right     OTHER SURGICAL HISTORY      I&D pilonidal cyst    SEPTOPLASTY      TONSILLECTOMY      TUBAL LIGATION         Physical Exam    Airway   Mallampati: III   TM distance: >3 FB   Neck ROM: full  Cardiovascular - normal   Rhythm: regular   Rate: normalPulmonary - normal   clear to auscultation        Anesthesia Plan    Plan: spinal    Technique: spinal      Airway: natural airway / supplemental oxygen    2 ASA  Blood Products:     Use of Blood Products Discussed: Yes     Consented to blood products  Anesthetic plan and risks discussed with: patient  Induction:    intravenous   Postop Plan:   Patient Disposition: inpatient floor planned admission

## 2024-05-15 NOTE — CONSULTS
Hospital Medicine Service -  Daily Progress Note       SUBJECTIVE   Interval History: Patient seen and examined in PACU  S/p left knee arthroplasty  Pain under control  Denies any chest pain or shortness of breath     OBJECTIVE      Vital signs in last 24 hours:  Temp:  [36.2 °C (97.2 °F)-36.8 °C (98.2 °F)] 36.2 °C (97.2 °F)  Heart Rate:  [65-78] 67  Resp:  [15-18] 15  BP: (101-146)/(50-77) 111/55    Intake/Output Summary (Last 24 hours) at 5/15/2024 1331  Last data filed at 5/15/2024 1231  Gross per 24 hour   Intake 1217 ml   Output 5 ml   Net 1212 ml       PHYSICAL EXAMINATION      Physical Exam    Alert and orientated x 3 heart sounds are normal chest is clear abdomen is soft nontender no organomegaly CNS examination is grossly nonfocal left knee is dressed with palpable DP   LINES, CATHETERS, DRAINS, AIRWAYS, AND WOUNDS   Lines, Drains, and Airways:  Wounds (agree with documentation and present on admission):  Peripheral IV (Adult) 05/15/24 Posterior;Right Hand (Active)   Number of days: 0       Surgical Incision Knee Left (Active)   Number of days: 0         Comments:      LABS / IMAGING / TELE      Labs  No new labs.      Imaging  I have independently reviewed the pertinent imaging from the last 24 hrs.    ECG/Telemetry  I have independently reviewed the telemetry. No events for the last 24 hours.    ASSESSMENT AND PLAN      Status post total left knee replacement  Assessment & Plan  Post op day 0  Pain control as per surgeon  Encourage Incentive Tanner  Pt/Ot  Recommend bowel regimen  dvt prophylaxis per surgery     Weight loss  Assessment & Plan  Can restart Monjouro at discharge    Hyperlipidemia  Assessment & Plan  Continue Crestor          VTE Assessment: Padua    VTE Prophylaxis:  Current anticoagulants:    None      Code Status: Full Code      Estimated Discharge Date: 5/16/2024   Disposition Planning: as per ortho     Gaston Santos MD  5/15/2024

## 2024-05-15 NOTE — PROGRESS NOTES
Orthopedic Post Surgical Note    64 Year old female s/p Left TKA.     Physical Exam:  - sensation intact to light touch  - active dorsiflexion, plantarflexion, extensor hallucis longus    A/P:   - pain control  - physical therapy/occupational therapy  - DVT prophylaxis> asa 81 mg BID   - [facility] [place] consult for medical management

## 2024-05-15 NOTE — OR SURGEON
Pre-Procedure patient identification:  I am the primary operating surgeon/proceduralist and I have reviewed the applicable pathology reports and radiology studies for this procedure. I have identified the patient and confirmed laterality is left on 05/15/24 at 9:21 AM Bobby Cannon MD  Phone Number: [phone] Pre-Procedure patient identification:  I am the primary operating surgeon/proceduralist and I have reviewed the applicable pathology reports and radiology studies for this procedure. I have identified the patient on 05/15/24 at 9:21 AM Bobby Cannon MD  Phone Number: [phone]

## 2024-05-15 NOTE — ANESTHESIA POSTPROCEDURE EVALUATION
Patient: Maral Nieves    Procedure Summary       Date: 05/15/24 Room / Location: [facility] OR  / [facility] OR [facility]    Anesthesia Start: 1028 Anesthesia Stop: 1241    Procedure: KNEE ARTHROPLASTY TOTAL (Left: Knee) Diagnosis:       Osteoarthritis of left knee, unspecified osteoarthritis type      (Osteoarthritis of left knee, unspecified osteoarthritis type [M17.12])    Surgeons: Bobby Cannon MD Responsible Provider: Bev Canela MD    Anesthesia Type: spinal ASA Status: 2            Anesthesia Type: spinal  PACU Vitals  5/15/2024 1233 - 5/15/2024 1333        5/15/2024  1245 5/15/2024  1300 5/15/2024  1310 5/15/2024  1315    BP: 101/50 111/55 109/56 --    Temp: 36.3 °C (97.4 °F) 36.2 °C (97.2 °F) -- --    Pulse: 65 67 66 62    Resp: 15 15 31 22    SpO2: 92 % 96 % -- 97 %                5/15/2024  1320 5/15/2024  1325 5/15/2024  1330       BP: 103/57 -- 141/66     Temp: -- -- --     Pulse: 57 60 53     Resp: 25 15 11     SpO2: -- -- 97 %               Anesthesia Post Evaluation    Pain management: satisfactory to patient  Mode of pain management: additional block medications  Patient location during evaluation: PACU  Patient participation: complete - patient participated  Level of consciousness: awake and alert  Cardiovascular status: acceptable  Airway Patency: adequate  Respiratory status: acceptable  Hydration status: stable  Pain well controlled after spinal preservative free morphine administration: Yes  Continue 24 hours observation after preservative free morphine administration: Yes  Anesthetic complications: no

## 2024-05-15 NOTE — ANESTHESIOLOGIST PRE-PROCEDURE ATTESTATION
Pre-Procedure Patient Identification:  I am the Primary Anesthesiologist and have identified the patient on 05/15/24 at 9:17 AM.   I have confirmed the procedure(s) will be performed by the following surgeon/proceduralist Emper, Bobby AMANDA MD.

## 2024-05-15 NOTE — ANESTHESIA PROCEDURE NOTES
Spinal Block    Patient location during procedure: OR  Staffing  Performed: anesthesiologist   Anesthesiologist: Bev Canela MD  Performed by: Kath Richter CRNA  Authorized by: Kath Richter CRNA    Reason for block: primary anesthetic  Preanesthetic Checklist  Completed: patient identified, surgical consent, pre-op evaluation, timeout performed, IV checked, risks and benefits discussed, monitors and equipment checked and sterile field maintained during procedure  Spinal Block  Patient position: sitting  Prep: ChloraPrep and site prepped and draped  Patient monitoring: heart rate, cardiac monitor, continuous pulse ox and blood pressure  Approach: midline  Location: L3-4  Injection technique: single-shot  Needle  Needle type: Sprotte   Needle gauge: 24 G  Needle length: 3.5 in  Assessment  Events: cerebrospinal fluid  Additional Notes  Procedure well tolerated. Vital signs stable.  1.8cc 0.75% heavy bupivicaine pt miguel w/o prob

## 2024-05-15 NOTE — OP NOTE
Operative Report    Date of procedure: 5/15/2024    Pre-Op Diagnosis: Degenerative arthritis left knee    Post-Op Diagnosis: Same    Procedure: Left total knee replacement using Crystal triathlon cruciate retaining cementless prosthesis.  The components were 3 tibia, a 4 left CR femur, 3 x 9 mm polyethylene CS insert and a 35 mm peg patella.  All were cementless    Surgeon:  Bobby Cnanon MD    Assistant: PGY 4 resident    Anesthesia: Spinal    Estimated Blood Loss:  Minimal    Fluids Replaced: As per anesthesia    Complications:  None    Specimen: None    Findings: Degenerative arthritis    Description of Procedure:    After the induction of anesthesia and appropriate timeout was performed to confirm the side and site of surgery.  The leg was elevated scrubbed with ChloraPrep and draped in the usual manner.  The limb was exsanguinated with an Esmarch bandage and the tourniquet inflated to 275 mmHg.  A vertical anteromedial incision was performed extending from the medial aspect of the superior pole the patella to the medial aspect of the tibial tubercle.  It was taken down through the subcutaneous tissue.  Bleeding was controlled with electrocautery.  A mid vastus arthrotomy was performed medially and the retropatellar fat pad was excised.  The proximal medial tibia was exposed in a subperiosteal manner.  Attention was then directed to the patella which was sized and prepared in the usual manner.  The anterior cruciate ligament was then excised.  The tibia was dislocated anteriorly.  The remnants of the medial and lateral menisci were debrided.  The tibia was dislocated anteriorly.  An extra medullary alignment guide was used to resect the articular surface of the tibia perpendicular to the long axis of the tibia.  Osteophytes were debrided.  Sizing was then performed and the tibia was prepared.  Attention was then directed to the femur which was aligned using an intramedullary alignment guide to resect the distal  femur.  The anterior posterior sizing guide was used and the appropriate block was chosen for the 4-in-1 cutting guide was then used to resect the remaining portions of the bone.  The flexion and extension gaps were then assessed and found to be balanced and symmetrical in flexion and extension.  The soft tissue was infiltrated half percent ropivacaine. .  Trial reduction was then performed using the above-mentioned sizes.  This gave excellent alignment, good stability in flexion and extension, and excellent tracking of the patella.    The trial components were then removed.  The wound was thoroughly irrigated with a solution of saline.    The knee was then articulated and found to be stable in flexion and extension with excellent patellar tracking and full range of motion.    After thorough irrigation the arthrotomy was closed with a running #2 Quill.  The subcutaneous tissue was closed with interrupted 2-0 Vicryl.  The skin was closed with 3-0 Monocryl in a subcuticular manner compression dressings applied and the tourniquet was deflated.    Patient tolerated procedure well in the operating room in satisfactory condition.  I was present and scrubbed through all the major components of the operation.    Bobby Cannon MD

## 2024-05-15 NOTE — PROGRESS NOTES
Patient: Maral Nieves  Location: [facility] 5PAV 5404  MRN:  [id number]  Today's date:  5/15/2024    Attempted to see patient for therapy. Unable due to medical hold (Pt pleasantly refusing PT evaluation due to pain and numbness from Sx. Will attempt PT evaluation as able and appropriate.).

## 2024-05-15 NOTE — OR SURGEON
Pre-Procedure patient identification:  I am the primary operating surgeon/proceduralist and I have reviewed the applicable pathology reports and radiology studies for this procedure. I have identified the patient and confirmed laterality is left on 05/15/24 at 8:48 AM Bobby Cannon MD  Phone Number: [phone]

## 2024-05-15 NOTE — ASSESSMENT & PLAN NOTE
s/p LTKA on 5/15/24  - Management as per Ortho  - Pain control per Ortho  - DVT ppx per Ortho  - PT/OT  - Encourage IS  - Bowel regimen

## 2024-05-16 VITALS
BODY MASS INDEX: 30.66 KG/M2 | DIASTOLIC BLOOD PRESSURE: 58 MMHG | SYSTOLIC BLOOD PRESSURE: 131 MMHG | OXYGEN SATURATION: 96 % | TEMPERATURE: 98.1 F | WEIGHT: 184 LBS | RESPIRATION RATE: 14 BRPM | HEIGHT: 65 IN | HEART RATE: 88 BPM

## 2024-05-16 PROBLEM — D62 ACUTE BLOOD LOSS AS CAUSE OF POSTOPERATIVE ANEMIA: Status: ACTIVE | Noted: 2024-05-16

## 2024-05-16 LAB
ANION GAP SERPL CALC-SCNC: 8 MEQ/L (ref 3–15)
BUN SERPL-MCNC: 17 MG/DL (ref 7–25)
CALCIUM SERPL-MCNC: 8.1 MG/DL (ref 8.6–10.3)
CHLORIDE SERPL-SCNC: 103 MEQ/L (ref 98–107)
CO2 SERPL-SCNC: 22 MEQ/L (ref 21–31)
CREAT SERPL-MCNC: 0.7 MG/DL (ref 0.6–1.2)
EGFRCR SERPLBLD CKD-EPI 2021: >60 ML/MIN/1.73M*2
ERYTHROCYTE [DISTWIDTH] IN BLOOD BY AUTOMATED COUNT: 11.9 % (ref 11.7–14.4)
GLUCOSE SERPL-MCNC: 152 MG/DL (ref 70–99)
HCT VFR BLD AUTO: 33 % (ref 35–45)
HGB BLD-MCNC: 10.7 G/DL (ref 11.8–15.7)
MCH RBC QN AUTO: 30.1 PG (ref 28–33.2)
MCHC RBC AUTO-ENTMCNC: 32.4 G/DL (ref 32.2–35.5)
MCV RBC AUTO: 92.7 FL (ref 83–98)
PDW BLD AUTO: 11.3 FL (ref 9.4–12.3)
PLATELET # BLD AUTO: 179 K/UL (ref 150–369)
POTASSIUM SERPL-SCNC: 4.1 MEQ/L (ref 3.5–5.1)
RBC # BLD AUTO: 3.56 M/UL (ref 3.93–5.22)
SODIUM SERPL-SCNC: 133 MEQ/L (ref 136–145)
WBC # BLD AUTO: 9.39 K/UL (ref 3.8–10.5)

## 2024-05-16 PROCEDURE — 63700000 HC SELF-ADMINISTRABLE DRUG: Performed by: NURSE PRACTITIONER

## 2024-05-16 PROCEDURE — 63600000 HC DRUGS/DETAIL CODE: Mod: JZ | Performed by: NURSE PRACTITIONER

## 2024-05-16 PROCEDURE — 97116 GAIT TRAINING THERAPY: CPT | Mod: GP,CQ,59

## 2024-05-16 PROCEDURE — 97166 OT EVAL MOD COMPLEX 45 MIN: CPT | Mod: GO

## 2024-05-16 PROCEDURE — 97150 GROUP THERAPEUTIC PROCEDURES: CPT | Mod: GP,CQ

## 2024-05-16 PROCEDURE — 97535 SELF CARE MNGMENT TRAINING: CPT | Mod: GO,59

## 2024-05-16 PROCEDURE — 63700000 HC SELF-ADMINISTRABLE DRUG: Performed by: STUDENT IN AN ORGANIZED HEALTH CARE EDUCATION/TRAINING PROGRAM

## 2024-05-16 PROCEDURE — 36415 COLL VENOUS BLD VENIPUNCTURE: CPT | Performed by: NURSE PRACTITIONER

## 2024-05-16 PROCEDURE — 99232 SBSQ HOSP IP/OBS MODERATE 35: CPT | Performed by: HOSPITALIST

## 2024-05-16 PROCEDURE — 97162 PT EVAL MOD COMPLEX 30 MIN: CPT | Mod: GP

## 2024-05-16 PROCEDURE — 25800000 HC PHARMACY IV SOLUTIONS: Performed by: NURSE PRACTITIONER

## 2024-05-16 PROCEDURE — 97150 GROUP THERAPEUTIC PROCEDURES: CPT | Mod: GO,CO

## 2024-05-16 PROCEDURE — 80048 BASIC METABOLIC PNL TOTAL CA: CPT | Performed by: NURSE PRACTITIONER

## 2024-05-16 PROCEDURE — 85027 COMPLETE CBC AUTOMATED: CPT | Performed by: NURSE PRACTITIONER

## 2024-05-16 PROCEDURE — 63600000 HC DRUGS/DETAIL CODE: Performed by: STUDENT IN AN ORGANIZED HEALTH CARE EDUCATION/TRAINING PROGRAM

## 2024-05-16 RX ORDER — ZOLPIDEM TARTRATE 10 MG/1
10 TABLET ORAL NIGHTLY
Start: 2024-05-16 | End: 2024-05-21

## 2024-05-16 RX ORDER — HYDROMORPHONE HYDROCHLORIDE 1 MG/ML
0.5 INJECTION, SOLUTION INTRAMUSCULAR; INTRAVENOUS; SUBCUTANEOUS ONCE
Status: COMPLETED | OUTPATIENT
Start: 2024-05-16 | End: 2024-05-16

## 2024-05-16 RX ORDER — ACETAMINOPHEN 325 MG/1
650 TABLET ORAL EVERY 6 HOURS PRN
Start: 2024-05-16 | End: 2024-06-15

## 2024-05-16 RX ORDER — AMOXICILLIN 250 MG
1 CAPSULE ORAL 2 TIMES DAILY
Start: 2024-05-16

## 2024-05-16 RX ORDER — NAPROXEN SODIUM 220 MG/1
81 TABLET, FILM COATED ORAL 2 TIMES DAILY
Start: 2024-05-16 | End: 2024-06-13

## 2024-05-16 RX ORDER — POLYETHYLENE GLYCOL 3350 17 G/17G
17 POWDER, FOR SOLUTION ORAL DAILY PRN
Start: 2024-05-16 | End: 2024-06-15

## 2024-05-16 RX ORDER — OXYCODONE HYDROCHLORIDE 5 MG/1
5-10 TABLET ORAL EVERY 4 HOURS PRN
Qty: 30 TABLET | Refills: 0 | Status: SHIPPED | OUTPATIENT
Start: 2024-05-16 | End: 2024-05-21

## 2024-05-16 RX ORDER — NALOXONE HYDROCHLORIDE 4 MG/.1ML
1 SPRAY NASAL ONCE AS NEEDED
Qty: 1 EACH | Refills: 0 | Status: SHIPPED | OUTPATIENT
Start: 2024-05-16

## 2024-05-16 RX ADMIN — OXYCODONE HYDROCHLORIDE 10 MG: 5 TABLET ORAL at 10:11

## 2024-05-16 RX ADMIN — SODIUM CHLORIDE: 9 INJECTION, SOLUTION INTRAVENOUS at 04:34

## 2024-05-16 RX ADMIN — KETOROLAC TROMETHAMINE 15 MG: 15 INJECTION, SOLUTION INTRAMUSCULAR; INTRAVENOUS at 03:57

## 2024-05-16 RX ADMIN — HYDROMORPHONE HYDROCHLORIDE 0.5 MG: 1 INJECTION, SOLUTION INTRAMUSCULAR; INTRAVENOUS; SUBCUTANEOUS at 04:34

## 2024-05-16 RX ADMIN — ACETAMINOPHEN 650 MG: 325 TABLET ORAL at 11:32

## 2024-05-16 RX ADMIN — DEXAMETHASONE SODIUM PHOSPHATE 8 MG: 4 INJECTION, SOLUTION INTRA-ARTICULAR; INTRALESIONAL; INTRAMUSCULAR; INTRAVENOUS; SOFT TISSUE at 06:07

## 2024-05-16 RX ADMIN — ACETAMINOPHEN 650 MG: 325 TABLET ORAL at 16:04

## 2024-05-16 RX ADMIN — OXYCODONE HYDROCHLORIDE 10 MG: 5 TABLET ORAL at 06:07

## 2024-05-16 RX ADMIN — CEFAZOLIN 2 G: 2 INJECTION, POWDER, FOR SOLUTION INTRAMUSCULAR; INTRAVENOUS at 03:56

## 2024-05-16 RX ADMIN — OXYCODONE HYDROCHLORIDE 10 MG: 5 TABLET ORAL at 02:03

## 2024-05-16 RX ADMIN — POLYETHYLENE GLYCOL 3350 17 G: 17 POWDER, FOR SOLUTION ORAL at 08:57

## 2024-05-16 RX ADMIN — ASPIRIN 81 MG CHEWABLE TABLET 81 MG: 81 TABLET CHEWABLE at 08:57

## 2024-05-16 RX ADMIN — ACETAMINOPHEN 650 MG: 325 TABLET ORAL at 04:33

## 2024-05-16 RX ADMIN — KETOROLAC TROMETHAMINE 15 MG: 15 INJECTION, SOLUTION INTRAMUSCULAR; INTRAVENOUS at 16:04

## 2024-05-16 RX ADMIN — SENNOSIDES AND DOCUSATE SODIUM 1 TABLET: 50; 8.6 TABLET ORAL at 08:57

## 2024-05-16 RX ADMIN — KETOROLAC TROMETHAMINE 15 MG: 15 INJECTION, SOLUTION INTRAMUSCULAR; INTRAVENOUS at 08:57

## 2024-05-16 ASSESSMENT — COGNITIVE AND FUNCTIONAL STATUS - GENERAL
CLIMB 3 TO 5 STEPS WITH RAILING: 3 - A LITTLE
DRESSING REGULAR UPPER BODY CLOTHING: 4 - NONE
WALKING IN HOSPITAL ROOM: 3 - A LITTLE
DRESSING REGULAR UPPER BODY CLOTHING: 3 - A LITTLE
HELP NEEDED FOR BATHING: 3 - A LITTLE
MOVING TO AND FROM BED TO CHAIR: 4 - NONE
MOVING TO AND FROM BED TO CHAIR: 3 - A LITTLE
TOILETING: 4 - NONE
AFFECT: WFL
CLIMB 3 TO 5 STEPS WITH RAILING: 3 - A LITTLE
DRESSING REGULAR LOWER BODY CLOTHING: 3 - A LITTLE
MOVING TO AND FROM BED TO CHAIR: 3 - A LITTLE
WALKING IN HOSPITAL ROOM: 4 - NONE
AFFECT: WFL
STANDING UP FROM CHAIR USING ARMS: 3 - A LITTLE
HELP NEEDED FOR PERSONAL GROOMING: 4 - NONE
STANDING UP FROM CHAIR USING ARMS: 3 - A LITTLE
HELP NEEDED FOR PERSONAL GROOMING: 3 - A LITTLE
STANDING UP FROM CHAIR USING ARMS: 4 - NONE
WALKING IN HOSPITAL ROOM: 3 - A LITTLE
TOILETING: 3 - A LITTLE
EATING MEALS: 4 - NONE
DRESSING REGULAR LOWER BODY CLOTHING: 3 - A LITTLE
AFFECT: WFL
CLIMB 3 TO 5 STEPS WITH RAILING: 4 - NONE
HELP NEEDED FOR BATHING: 3 - A LITTLE
EATING MEALS: 4 - NONE

## 2024-05-16 NOTE — PROGRESS NOTES
Occupational Therapy -  Daily Treatment/Progress Note     Patient: Maral Nieves  Location: [facility] 5PAV 5404  MRN: [id number]  Today's date: 5/16/2024    HISTORY OF PRESENT ILLNESS     Maral is a 64 y.o. female admitted on 5/15/2024 with Osteoarthritis of left knee, unspecified osteoarthritis type [M17.12]  S/P total knee replacement, left [Z96.652]. Principal problem is No Principal Problem: There is no principal problem currently on the Problem List. Please update the Problem List and refresh..    Past Medical History  Maral has a past medical history of Family history of bleeding or clotting disorder, Herniated disc, cervical, Lipid disorder, Lumbar herniated disc, OA (osteoarthritis), and Seasonal allergies.    History of Present Illness   Pt is a 64 y.o. female s/p L TKA by Dr. Cannon 5/15/2024  PRIOR LEVEL OF FUNCTION AND LIVING ENVIRONMENT     Prior Level of Function      Flowsheet Row Most Recent Value   Dominant Hand right   Ambulation independent   Transferring independent   Toileting independent   Bathing independent   Dressing independent   Eating independent   IADLs independent   Driving/Transportation    Communication understands/communicates without difficulty   Prior Level of Function Comment IND w/ all ADLs and mobility w/o AD. (+)    Assistive Device Currently Used at Home none  [owns RW]          Prior Living Environment      Flowsheet Row Most Recent Value   People in Home spouse   Current Living Arrangements home   Living Environment Comment Lives with  in 2SH, 2 VY from front, full bathroom with stall shower on 1st fl. FF to 2fl bed and bathroom (tub)          Occupational Profile      Flowsheet Row Most Recent Value   Successful Occupations works as  at [employer]   Environmental Supports and Barriers From home with supportive spouse          VITALS AND PAIN     OT Vitals      Date/Time Pulse HR Source  BP [facility]   05/16/24 1005 47 Monitor 108/58 MTC          OT Pain      Date/Time Pain Type Side/Orientation Location Rating: Rest Rating: Activity Interventions [facility]   05/16/24 1005 -- left knee 8 9 care clustered [facility]   05/16/24 1056 Pain Reassessment -- -- 8 -- -- ANK             Objective   OBJECTIVE     Start time:  1005  End time:  1122  Session Length: 77 min  Mode of Treatment: occupational therapy, group therapy    General Observations  Patient received in therapy gym, in chair. She was no issues or concerns identified by nurse prior to session, agreeable to therapy.      Precautions: fall, weight bearing  Extremity Weight-Bearing Status: left lower extremity  Left LE Weight-Bearing Status: weight-bearing as tolerated (WBAT)           OT Eval and Treat - 05/16/24 1005          Cognition    Orientation Status oriented x 4     Follows Commands WNL        Bed Mobility    Bed Mobility Activities supine to sit;sit to supine     [place] independent     Safety/Cues increased time to complete;verbal cues;technique     Comment (I) to perform bed mobility, to<>from flat bed without use of bed featuers. Cues provided for technique.        Mobility Belt    Mobility Belt Used for All Out of Bed Activity yes        Sit/Stand Transfer    Surface chair with arm rests;edge of bed     [place] modified independence     Safety/Cues increased time to complete;preparatory posture;technique;verbal cues     Assistive Device walker, front-wheeled     Transfer Comments Mod I for all transfers this session, utiliizing RW. Cues provided for safe preparatory posture and hand placement, pt receptive.        Toilet Transfer    Transfer Technique sit/stand     [place] modified independence     Safety/Cues technique;preparatory posture;verbal cues;proper use of assistive device     Assistive Device walker, front-wheeled     Comment Pt provided both verbal education and visual demonstration of toilet transfer. Pt provided education  on safe transfer techniques as well as AD/AE avaiable for increased safety.  Mod I on<>off standard height toilet without GB's ,technique provided.        Shower/Tub Transfer    Comment Pt provided both verbal education and visual demonstration of safe tub transfer. Pt education on use of tub bench/chair for increased safety during transfer. Educated on transfer techniques for each AD. Recommending pt have close assist for inital trasnfer to ensure safety.  Pt reporting WIS, reporting comfortable to complete transfer upon return home with spouse assist.        Functional Mobility    Distance in therapy gym     Functional Mobility [place] modified independence     Safety/Cues verbal cues;proper use of assistive device     Assistive Device walker, front-wheeled     Functional Mobility Comments Mod I for all mobility this session, utiliizing RW. No overt LOB noted this session.        Bathing    Comment Pt educated on techniques for increased safety with bathing. Demonstration and verbal education provided on AE/AD to increase energy conservation during task, pt receptive.        Lower Body Dressing    Comment Pt provided extensive education/demonstration of LB dressing AE to increased independance, energy conservation and safety. Pt educated on adaptive techniques such as Figure 4 Method as well as dressing affected limb first, for increased ease. Pt receptive to education provided, verbilizing unerstanding.        Toileting    Comment Pt provided education on AD's to increased independance and energy conservation. Pt provided education on 3-1 commode and raised toilet seats as needed for increased (I), pt receptive.        Balance    Static Sitting Balance WNL     Dynamic Sitting Balance WNL     Sit to Stand Dynamic Balance WNL     Static Standing Balance WNL     Dynamic Standing Balance WNL                     Functional Reach Test  Trial One: Functional Reach Test (in): 4 inches  Trial Two: Functional Reach  Test (in): 5 inches  Average (in): 4.5 inches                 Session Outcome  Patient in chair, in therapy gym at end of session, returned to room by therapy aide. Nursing notified about patient's performance and change in vital signs.    AM-PAC™ - ADL (Current Function)     Putting on/taking off regular lower body clothing 3 - A Little   Bathing 3 - A Little   Toileting 4 - None   Putting on/taking off regular upper body clothing 4 - None   Help for taking care of personal grooming 4 - None   Eating meals 4 - None   AM-PAC™ ADL Score 22      ASSESSMENT AND PLAN     Progress Summary  Pt agreeable to therapy this date. Pt demonstrating good safety/balance this session with use of RW. Pt is functioning at an overall Mod (I) level with all transfers/mobility. Pt provided extensive verbal education as well as visual demonstration of AE available for transfer and bathing/dressing, pt receptive to education. Recommending pt return home with assist at d/c. Pt reports comfortable to return home with spouse assist.    Patient/Family Therapy Goal Statement: to return home at d/c    OT Plan      Flowsheet Row Most Recent Value   Rehab Potential good, to achieve stated therapy goals at 05/16/2024 0813   Therapy Frequency daily at 05/16/2024 0813   Planned Therapy Interventions activity tolerance training, adaptive equipment training, BADL retraining, functional balance retraining, occupation/activity based interventions, patient/caregiver education/training, transfer/mobility retraining at 05/16/2024 0813            OT Discharge Recommendations      Flowsheet Row Most Recent Value   OT Recommended Discharge Disposition home with assistance at 05/16/2024 0813   Anticipated Equipment Needs if Discharged Home (OT) dressing equipment, walker, front-wheeled, shower chair, commode, 3-in-1 at 05/16/2024 0813                 OT Goals      Flowsheet Row Most Recent Value   Bed Mobility Goal 1    Activity/Assistive Device bed mobility  activities, all at 05/16/2024 0813   [place] modified independence at 05/16/2024 0813   Time Frame 3-5 days at 05/16/2024 0813   Progress/Outcome goal ongoing at 05/16/2024 0813   Transfer Goal 1    Activity/Assistive Device sit-to-stand/stand-to-sit, toilet at 05/16/2024 0813   [place] modified independence at 05/16/2024 0813   Time Frame 3-5 days at 05/16/2024 0813   Progress/Outcome goal ongoing at 05/16/2024 0813   Transfer Goal 2    Activity/Assistive Device tub at 05/16/2024 0813   [place] supervision required at 05/16/2024 0813   Time Frame 3-5 days at 05/16/2024 0813   Progress/Outcome goal ongoing at 05/16/2024 0813   Dressing Goal 1    Activity/Adaptive Equipment lower body dressing at 05/16/2024 0813   [place] modified independence at 05/16/2024 0813   Time Frame 3-5 days at 05/16/2024 0813   Strategies/Barriers LH AE and adaptive strategies edu at 05/16/2024 0813   Progress/Outcome goal ongoing at 05/16/2024 0813

## 2024-05-16 NOTE — PROGRESS NOTES
Physical Therapy -  Daily Treatment/Progress Note     Patient: Maral Nieves  Location: [facility] 5PAV 5404  MRN: [id number]  Today's date: 5/16/2024    HISTORY OF PRESENT ILLNESS     Maral is a 64 y.o. female admitted on 5/15/2024 with Osteoarthritis of left knee, unspecified osteoarthritis type [M17.12]  S/P total knee replacement, left [Z96.652]. Principal problem is No Principal Problem: There is no principal problem currently on the Problem List. Please update the Problem List and refresh..    Past Medical History  Maral has a past medical history of Family history of bleeding or clotting disorder, Herniated disc, cervical, Lipid disorder, Lumbar herniated disc, OA (osteoarthritis), and Seasonal allergies.    History of Present Illness   Pt is a 64 y.o. female s/p L TKA by Dr. Cannon 5/15/2024  PRIOR LEVEL OF FUNCTION AND LIVING ENVIRONMENT     Prior Level of Function      Flowsheet Row Most Recent Value   Dominant Hand right   Ambulation independent   Transferring independent   Toileting independent   Bathing independent   Dressing independent   Eating independent   IADLs independent   Driving/Transportation    Communication understands/communicates without difficulty   Prior Level of Function Comment IND w/ all ADLs and mobility w/o AD. (+)    Assistive Device Currently Used at Home none          Prior Living Environment      Flowsheet Row Most Recent Value   People in Home spouse   Current Living Arrangements home   Home Accessibility stairs to enter home (Group), stairs within home (Group)   Living Environment Comment Lives with  in 2SH, 2 VY from front, full bathroom with stall shower on 1st fl. FF to 2fl bed and bathroom (tub)          VITALS AND PAIN     PT Vitals      Date/Time Pulse HR Source BP Who   05/16/24 1005 47 Monitor 108/58 MTC          PT Pain      Date/Time Pain Type Side/Orientation Location Rating: Rest Rating: Activity Interventions  1st dose of Cytotec administered per order.   Who   05/16/24 1005 -- left knee 8 9 care clustered [facility]   05/16/24 1056 Pain Reassessment -- -- 8 -- -- ANK             Objective   OBJECTIVE     Start time:  0954  End time:  1125  Session Length: 91 min  Mode of Treatment: physical therapy, group therapy    General Observations  Patient received in therapy gym. She was agreeable to therapy, no issues or concerns identified by nurse prior to session. Pt rec'd at gym    Precautions: fall, weight bearing  Extremity Weight-Bearing Status: left lower extremity  Left LE Weight-Bearing Status: weight-bearing as tolerated (WBAT)           PT Eval and Treat - 05/16/24 0954          Cognition    Orientation Status oriented x 4     Affect/Mental Status WFL     Follows Commands WFL     Cognitive Function WFL        Lower Extremity Range of Motion    Knee, Left (ROM) AROM 16-70        Mobility Belt    Mobility Belt Used for All Out of Bed Activity yes        Sit/Stand Transfer    [place] distant supervision     Safety/Cues sequencing;technique;increased time to complete     Assistive Device walker, front-wheeled     Transfer Comments VC's for safe techniques + body mechanics, feet under SARAH. VC’s for hand placement. Verbalized understanding of techniques educated.        Car Transfer    Transfer Technique stand-sit     [place] distant supervision     Safety/Cues sequencing;technique;increased time to complete     Assistive Device walker, front-wheeled     Comment VC +TC's needed for proper technique w/ hand placement for transfers. Tendency to pull up from the walker. Verbalized understanding of techniques educated.        Gait Training    [place], Gait distant supervision     Safety/Cues increased time to complete;sequencing;technique     Assistive Device walker, front-wheeled     Distance in Feet 125 feet     Pattern step-to;step-through     Deviations/Abnormal Patterns antalgic;gait speed decreased;step length decreased;stride length decreased;weight shifting  decreased     Advanced Gait Activity curb negotiation     Comment (Gait/Stairs) Vc's for heel strike and toe off, with step-through sequencing technique. Verbalized understanding of techniques educated.        Curb Negotiation    [place] distant supervision     Assistive Device walker, front-wheeled     Comment Vc's needed for proper sequencing with progression of RW, body positioning within walker and step to technique. Verbalized understanding of techniques educated.        Stairs Training    [place], Stairs distant supervision     Safety/Cues increased time to complete;sequencing;technique     Assistive Device railing;cane, straight     Handrail Location (Stairs) right side (ascending)     Number of Stairs 4     Ascending Stairs Technique step-to-step     Descending Stairs Technique step-to-step     Comment Pt req'd cues for management of assistive device and cues for sequencing for BLE's. Recommending Supervision on stairs once d/c'd. Pt's family not present for treatment. Pt educated on how family can provide assistance / supervision for safety. Verbalized understanding of techniques educated.        Balance    Static Sitting Balance WFL     Dynamic Sitting Balance WFL     Sit to Stand Dynamic Balance WFL     Static Standing Balance WFL     Dynamic Standing Balance WFL     Comment, Balance No overt LOB,s supervision for VC's for techniques.        Lower Extremity (Therapeutic Exercise)    Exercise Position/Type seated     General Exercise bilateral;ankle pumps;quad sets;heel slides;gluteal sets;LAQ (long arc quad)     Reps and Sets x10     Comment Pt educated on TE's to perform to increase functional ROM and strength of BLE's.                      10 Meter Walk Test (Self-Selected Velocity)  Trial One: Ten Meter Walk Test (sec): 27 seconds  Trial Two: Ten Meter Walk Test (sec): 19 seconds  Trial One: Gait Speed (m/s): 0.22 m/s  Trial Two: Gait Speed (m/s): 0.32 m/s  Average Gait Speed (m/s): Two  Trials: 0.27 m/s                Session Outcome  Patient in therapy gym at end of session, returned to room by therapy aide. Nursing notified about patient's performance.    AM-PAC™ - Mobility (Current Function)     Turning form your back to your side while in flat bed without using bedrails 4 - None   Moving from lying on your back to sitting on the side of a flat bed without using bedrails 4 - None   Moving to and from a bed to a chair 4 - None   Standing up from a chair using your arms 4 - None   To walk in a hospital room 4 - None   Climbing 3-5 steps with a railing 4 - None   AM-PAC™ Mobility Score 24      ASSESSMENT AND PLAN     Progress Summary  Training conducted with negotiation of safe functional mobility techniques which included gait w/ RW and transfer techniques. Pt. demo good carryover with instructions. Also educated in application of surgical precautions with daily activities/mobility. Educated on TE's to assist with recovery following surgery to promote healing and strengthening of BLE's. Pt's family not present for treatment. Pt educated on how family can provide assistance / supervision for safety. Pt cleared PT gym for home with supervision / assistance from spouse. Req'd DS for mobility due to cues, no overt LOB's during session. Clear PT for home.    Patient/Family Therapy Goals Statement: go home    PT Plan      Flowsheet Row Most Recent Value   Rehab Potential good, to achieve stated therapy goals at 05/16/2024 0650   Therapy Frequency daily at 05/16/2024 0650   Planned Therapy Interventions transfer training, ROM (range of motion), gait training, stair training, home exercise program, patient/family education at 05/16/2024 0650            PT Discharge Recommendations      Flowsheet Row Most Recent Value   PT Recommended Discharge Disposition home with assistance at 05/16/2024 0650   Anticipated Equipment Needs if Discharged Home (PT) none at 05/16/2024 0650                 PT Goals       Flowsheet Row Most Recent Value   Transfer Goal 1    Activity/Assistive Device all transfers at 05/16/2024 0650   [place] modified independence at 05/16/2024 0650   Time Frame short-term goal (STG) at 05/16/2024 0650   Progress/Outcome goal ongoing at 05/16/2024 0650   Gait Training Goal 1    Activity/Assistive Device gait (walking locomotion) at 05/16/2024 0650   [place] modified independence at 05/16/2024 0650   Distance 100 at 05/16/2024 0650   Time Frame short-term goal (STG) at 05/16/2024 0650   Progress/Outcome goal ongoing at 05/16/2024 0650   ROM Goal 1    ROM Goal 1 AROM L knee flex ext 0-90 at 05/16/2024 0650   Time Frame short-term goal (STG) at 05/16/2024 0650   Progress/Outcome goal ongoing at 05/16/2024 0650   Stairs Goal 1    Activity/Assistive Device stairs, all skills at 05/16/2024 0650   [place] modified independence at 05/16/2024 0650   Number of Stairs 2 at 05/16/2024 0650   Time Frame short-term goal (STG) at 05/16/2024 0650   Progress/Outcome goal ongoing at 05/16/2024 0650

## 2024-05-16 NOTE — PLAN OF CARE
Problem: Adult Inpatient Plan of Care  Goal: Plan of Care Review  Outcome: Progressing  Flowsheets (Taken 5/16/2024 1029)  Progress: improving  Outcome Evaluation: OT eval completed. Pt is a 64 y.o. female s/p L TKA. Pt required SUP overall for STS, func mobility w/ RW, toilet transfer, and standing oral hygiene. Cues for safe RW management and transfer techs. Limited by pain, balance, activity tolerance, and LLE ROM/strength. OT to cont w/ POC in acute setting. Rec d/c home w/ (A) pending ortho class progress  Plan of Care Reviewed With: patient     Problem: Knee Arthroplasty  Goal: Optimal Functional Ability  Outcome: Progressing

## 2024-05-16 NOTE — PROGRESS NOTES
Occupational Therapy -  Initial Evaluation     Patient: Maral Nieves  Location: [facility] 5PAV 5404  MRN: [id number]  Today's date: 5/16/2024    HISTORY OF PRESENT ILLNESS     Maral is a 64 y.o. female admitted on 5/15/2024 with Osteoarthritis of left knee, unspecified osteoarthritis type [M17.12]  S/P total knee replacement, left [Z96.652]. Principal problem is No Principal Problem: There is no principal problem currently on the Problem List. Please update the Problem List and refresh..    Past Medical History  Maral has a past medical history of Family history of bleeding or clotting disorder, Herniated disc, cervical, Lipid disorder, Lumbar herniated disc, OA (osteoarthritis), and Seasonal allergies.    History of Present Illness   Pt is a 64 y.o. female s/p L TKA by Dr. Cannon 5/15/2024  PRIOR LEVEL OF FUNCTION AND LIVING ENVIRONMENT     Prior Level of Function      Flowsheet Row Most Recent Value   Dominant Hand right   Ambulation independent   Transferring independent   Toileting independent   Bathing independent   Dressing independent   Eating independent   IADLs independent   Driving/Transportation    Communication understands/communicates without difficulty   Prior Level of Function Comment IND w/ all ADLs and mobility w/o AD. (+)    Assistive Device Currently Used at Home none  [owns RW]          Prior Living Environment      Flowsheet Row Most Recent Value   People in Home spouse   Current Living Arrangements home   Living Environment Comment Lives with  in 2SH, 2 VY from front, full bathroom with stall shower on 1st fl. FF to 2fl bed and bathroom (tub)          Occupational Profile      Flowsheet Row Most Recent Value   Successful Occupations works as  at [employer]   Environmental Supports and Barriers From home with supportive spouse          VITALS AND PAIN     OT Vitals      Date/Time Pulse SpO2 Pt Activity O2  Therapy BP BP Location BP Method Pt Position [facility]   05/16/24 0813 55 94 % At rest None (Room air) 123/90 Right upper arm Automatic Sitting VV   05/16/24 0832 49 95 % At rest None (Room air) 115/56 Right upper arm Automatic Sitting VV          OT Pain      Date/Time Pain Type Side/Orientation Location Rating: Rest Rating: Activity Interventions [facility]   05/16/24 0813 Pain Assessment;Pain Reassessment left knee 9 9 position adjusted VV             Objective   OBJECTIVE     Start time:  0813  End time:  0836  Session Length: 23 min  Mode of Treatment: occupational therapy, individual therapy    General Observations  Patient received reclined, in chair. She was agreeable to therapy, no issues or concerns identified by nurse prior to session.      Precautions: fall, weight bearing  Extremity Weight-Bearing Status: left lower extremity  Left LE Weight-Bearing Status: weight-bearing as tolerated (WBAT)           OT Eval and Treat - 05/16/24 0813          Cognition    Orientation Status oriented x 4     Affect/Mental Status WFL     Follows Commands follows one-step commands     Cognitive Function WFL     Comment, Cognition Pleasant and cooperative t/o session        Vision Assessment/Intervention    Vision Assessment corrective lenses for reading        Hearing Assessment    Hearing Status WFL        Sensory Assessment    Sensory Assessment sensation intact, upper extremities        Upper Extremity Assessment    UE Assessment ROM and Strength WFL        Bed Mobility    Comment OOB on arrival        Mobility Belt    Mobility Belt Used for All Out of Bed Activity yes        Sit/Stand Transfer    Surface chair with arm rests     [place] supervision     Safety/Cues verbal cues;hand placement;sequencing;technique     Assistive Device walker, front-wheeled     Transfer Comments From/to recliner. cues for safe hand placement and tech. limited by pain but good eccentric control        Toilet Transfer    Transfer Technique  sit/stand     [place] supervision     Safety/Cues verbal cues;hand placement;technique     Assistive Device commode, 3-in-1     Comment BSC over toilet. Edu and rec for obtaining BSC to maximize safety and IND w/ toilet transfer and tasks. Pt receptive to provided edu and recs. SUP on/off with cues for hand placement and use of handrails        Functional Mobility    Distance in room/bathroom     Functional Mobility [place] supervision     Safety/Cues increased time to complete;proper use of assistive device     Assistive Device walker, front-wheeled     Functional Mobility Comments Short distance func mobility from recliner to bathroom and back. no overt LOB noted. incr time, cues for safe managemnet of RW close to SARAH.        Grooming    Tasks oral care (brushing teeth, cleaning dentures)     [place] set up;supervision     Position sink side;supported sitting     Setup Assistance obtain supplies     Comment fair standing balance and tolerance t/o        Toileting    Comment attempted to void, however, unable to at this time.        Balance    Static Sitting Balance WFL     Dynamic Sitting Balance WFL     Sit to Stand Dynamic Balance mild impairment     Static Standing Balance mild impairment     Dynamic Standing Balance mild impairment     Comment, Balance SUP w/ RW. no overt LOB noted. Limited by pain        Impairments/Safety Issues    Impairments Affecting Function pain;endurance/activity tolerance;range of motion (ROM);strength;balance                                    Education Documentation  Activity, taught by Georgette Arita OT at 5/16/2024 10:29 AM.  Learner: Patient  Readiness: Acceptance  Method: Explanation  Response: Verbalizes Understanding  Comment: OT POC/role, safety and tech w/ func transfers and mobility w/ RW, safety w/ ADLs, econ/pacing, ortho class, and d/c planning        Session Outcome  Patient reclined, in chair, leg(s) elevated at end of session, chair alarm on, all needs  met, call light in reach, personal items in reach. Nursing notified about patient's performance, patient's response to therapy/activity, and patient's position.    AM-PAC™ - ADL (Current Function)     Putting on/taking off regular lower body clothing 3 - A Little   Bathing 3 - A Little   Toileting 3 - A Little   Putting on/taking off regular upper body clothing 3 - A Little   Help for taking care of personal grooming 3 - A Little   Eating meals 4 - None   AM-PAC™ ADL Score 19      ASSESSMENT AND PLAN     Progress Summary  OT eval completed. Pt is a 64 y.o. female s/p L TKA. Pt required SUP overall for STS, func mobility w/ RW, toilet transfer, and standing oral hygiene. Cues for safe RW management and transfer techs. Limited by pain, balance, activity tolerance, and LLE ROM/strength. OT to cont w/ POC  in acute setting. Rec d/c home w/ (A) pending ortho class progress    Patient/Family Therapy Goal Statement: to return home at d/c    OT Plan      Flowsheet Row Most Recent Value   Rehab Potential good, to achieve stated therapy goals at 05/16/2024 0813   Therapy Frequency daily at 05/16/2024 0813   Planned Therapy Interventions activity tolerance training, adaptive equipment training, BADL retraining, functional balance retraining, occupation/activity based interventions, patient/caregiver education/training, transfer/mobility retraining at 05/16/2024 0813            OT Discharge Recommendations      Flowsheet Row Most Recent Value   OT Recommended Discharge Disposition home with assistance at 05/16/2024 0813   Anticipated Equipment Needs if Discharged Home (OT) dressing equipment, walker, front-wheeled, shower chair, commode, 3-in-1 at 05/16/2024 0813                 OT Goals      Flowsheet Row Most Recent Value   Bed Mobility Goal 1    Activity/Assistive Device bed mobility activities, all at 05/16/2024 0813   [place] modified independence at 05/16/2024 0813   Time Frame 3-5 days at 05/16/2024 0813    Progress/Outcome goal ongoing at 05/16/2024 0813   Transfer Goal 1    Activity/Assistive Device sit-to-stand/stand-to-sit, toilet at 05/16/2024 0813   [place] modified independence at 05/16/2024 0813   Time Frame 3-5 days at 05/16/2024 0813   Progress/Outcome goal ongoing at 05/16/2024 0813   Transfer Goal 2    Activity/Assistive Device tub at 05/16/2024 0813   [place] supervision required at 05/16/2024 0813   Time Frame 3-5 days at 05/16/2024 0813   Progress/Outcome goal ongoing at 05/16/2024 0813   Dressing Goal 1    Activity/Adaptive Equipment lower body dressing at 05/16/2024 0813   [place] modified independence at 05/16/2024 0813   Time Frame 3-5 days at 05/16/2024 0813   Strategies/Barriers LH AE and adaptive strategies edu at 05/16/2024 0813   Progress/Outcome goal ongoing at 05/16/2024 0813

## 2024-05-16 NOTE — HOSPITAL COURSE
Maral is a 64 y.o. female admitted on 5/15/2024 with Osteoarthritis of left knee, unspecified osteoarthritis type [M17.12]  S/P total knee replacement, left [Z96.652]. Principal problem is No Principal Problem: There is no principal problem currently on the Problem List. Please update the Problem List and refresh..    Past Medical History  Maral has a past medical history of Family history of bleeding or clotting disorder, Herniated disc, cervical, Lipid disorder, Lumbar herniated disc, OA (osteoarthritis), and Seasonal allergies.    History of Present Illness   Pt is a 64 y.o. female s/p L TKA by Dr. Cannon 5/15/2024

## 2024-05-16 NOTE — PLAN OF CARE
Problem: Adult Inpatient Plan of Care  Goal: Plan of Care Review  Outcome: Progressing  Flowsheets (Taken 5/16/2024 0841)  Progress: improving  Outcome Evaluation:   5/16/24 Pt is a 64 year old female s/p L TKA post op day one. Pt seen for PT session. The patient was educated on the role of PT in the acute care setting. The patient was instructed on proper technique when performing OOB transfers to ensure their safety. Gait training was performed using the necessary AD along with instructing the patient regarding the proper set up and use of the device. All questions and concerns addressed and answered during session. Pt req supervision (A) for functional txs, amb using RW and close supervision, limited by pain   pt to attend ortho class and return home at [place]  Plan of Care Reviewed With: patient

## 2024-05-16 NOTE — ASSESSMENT & PLAN NOTE
Noted 3.3g drop in hgb; 2/2 acute expected blood loss from surgery and dilutional from IVF  Asx  - No acute intervention warranted; recommend outpatient follow-up to ensure resolution

## 2024-05-16 NOTE — PLAN OF CARE
Plan of Care Review  Plan of Care Reviewed With: patient  Progress: no change  Outcome Evaluation: Pt. OOB assist x1. Unable to void, bladder scans monitored, order in for straight cath if >400mL. IVF maintained. Pain uncontrolled on current regimen- IV Dilaudid x1 for breakthrough. Bradycardic and hypotensive at times, asymptomatic. PLan for PT later today.

## 2024-05-16 NOTE — PROGRESS NOTES
Orthopedic Surgery Progress Note    Interval History:  No acute events overnight. Patient resting comfortably this morning.      Vital Signs:  Vitals:    05/16/24 0720   BP: 113/65   Pulse: 72   Resp: 16   Temp: 36.8 °C (98.3 °F)   SpO2: 95%       Labs:  CBC Results       05/16/24 05/07/24     0454 0916    WBC 9.39 4.12    RBC 3.56 4.63    HGB 10.7 14.0    HCT 33.0 42.3    MCV 92.7 91.4    MCH 30.1 30.2    MCHC 32.4 33.1     247          BMP Results       05/16/24 05/07/24     0454 0916     139    K 4.1 4.3    Cl 103 104    CO2 22 27    Glucose 152 77    BUN 17 17    Creatinine 0.7 0.8    Calcium 8.1 9.8    Anion Gap 8 8    EGFR >60.0 >60.0         Comment for EGFR at 0454 on 05/16/24: Calculation based on the Chronic Kidney Disease Epidemiology Collaboration (CKD-EPI) equation refit without adjustment for race.    Comment for EGFR at 0916 on 05/07/24: Calculation based on the Chronic Kidney Disease Epidemiology Collaboration (CKD-EPI) equation refit without adjustment for race.          Physical Exam:  General:  Awake, following commands  Symmetric chest rise bilaterally with normal effort    Musculoskeletal:    Left Lower Extremity:  Dressings clean, dry, intact  Fires iliopsoas, quadriceps, tibialis anterior, extensor hallucis longus, gastrocnemius/soleus  Sensation intact to light touch in superficial peroneal, deep peroneal, saphenous, sural, tibial nerve distributions  Palpable dorsalis pedis and posterior tibial pulse  Compartments soft and compressible    Assessment & Plan:  64 y.o. female s/p L TKA     - POD: 1 Day Post-Op  - Weight bearing status: WBAT   - DVT prophylaxis: ASA   - Analgesia  - PT/OT    Coy Bell MD  Orthopedic Surgery

## 2024-05-16 NOTE — PROGRESS NOTES
Physical Therapy -  Initial Evaluation     Patient: Maral Nieves  Location: [facility] 5PAV 5404  MRN: [id number]  Today's date: 5/16/2024    HISTORY OF PRESENT ILLNESS     Maarl is a 64 y.o. female admitted on 5/15/2024 with Osteoarthritis of left knee, unspecified osteoarthritis type [M17.12]  S/P total knee replacement, left [Z96.652]. Principal problem is No Principal Problem: There is no principal problem currently on the Problem List. Please update the Problem List and refresh..    Past Medical History  Maral has a past medical history of Family history of bleeding or clotting disorder, Herniated disc, cervical, Lipid disorder, Lumbar herniated disc, OA (osteoarthritis), and Seasonal allergies.    History of Present Illness   Pt is a 64 y.o. female s/p L TKA by Dr. Cannon 5/15/2024  PRIOR LEVEL OF FUNCTION AND LIVING ENVIRONMENT     Prior Level of Function      Flowsheet Row Most Recent Value   Dominant Hand right   Ambulation independent   Transferring independent   Toileting independent   Bathing independent   Dressing independent   Eating independent   IADLs independent   Driving/Transportation    Communication understands/communicates without difficulty   Prior Level of Function Comment IND w/ all ADLs and mobility w/o AD. (+)    Assistive Device Currently Used at Home none  [owns RW]          Prior Living Environment      Flowsheet Row Most Recent Value   People in Home spouse   Current Living Arrangements home   Living Environment Comment Lives with  in 2SH, 2 VY from front, full bathroom with stall shower on 1st fl. FF to 2fl bed and bathroom (tub)          VITALS AND PAIN     PT Vitals      Date/Time BP BP Method Pt Position [facility]   05/16/24 0650 113/65 Automatic Lying [place]          PT Pain      Date/Time Pain Type Side/Orientation Location Rating: Rest Rating: Activity Rating: Rest Rating: Activity Interventions [facility]   05/16/24 0650 Pain Assessment  left knee -- -- 4 - moderate pain 8 - severe pain care clustered [place]   05/16/24 0652 Pain Reassessment -- knee 2 8 -- -- -- SP             Objective   OBJECTIVE     Start time:  0650  End time:  0700  Session Length: 10 min  Mode of Treatment: individual therapy, physical therapy    General Observations  Patient received supine, in bed. She was no issues or concerns identified by nurse prior to session, agreeable to therapy.      Precautions: fall, weight bearing  Extremity Weight-Bearing Status: left lower extremity  Left LE Weight-Bearing Status: weight-bearing as tolerated (WBAT)           PT Eval and Treat - 05/16/24 0650          Cognition    Orientation Status oriented x 4     Affect/Mental Status WFL     Follows Commands WFL     Cognitive Function WFL        Vision Assessment/Intervention    Vision Assessment corrective lenses for reading        Hearing Assessment    Hearing Status WFL        Sensory Assessment    Sensory Assessment sensation intact, lower extremities        Lower Extremity Range of Motion    Knee, Left (ROM) AROM 8-55        Lower Extremity Strength    Hip, Left (Strength) 3/5     Knee, Left (Strength) 3/5     Ankle, Left (Strength) 5/5     Hip, Right (Strength) 3/5     Knee, Right (Strength) 5/5     Ankle, Right (Strength) 5/5        Bed Mobility    Bed Mobility Activities supine to sit     [place] supervision     Assistive Device head of bed elevated;bed rails     Comment exit to the R, no (A) needed, increased time needed due to pain        Mobility Belt    Mobility Belt Used for All Out of Bed Activity yes        Sit/Stand Transfer    Surface edge of bed     [place] supervision     Safety/Cues increased time to complete;verbal cues;proper use of assistive device     Assistive Device walker, front-wheeled     Transfer Comments supervision (A) from EOB, cues for hand placement, no LOB        Gait Training    [place], Gait close supervision     Assistive Device walker,  front-wheeled     Distance in Feet 5 feet     Pattern step-to     Deviations/Abnormal Patterns weight shifting decreased;renata decreased;step length decreased;stride length decreased;antalgic     Comment (Gait/Stairs) very antalgic gait, close supervision with RW, reports increased pain with WB, denies dizziness        Stairs Training    [place], Stairs other (see comments)     Comment TBA        Balance    Static Sitting Balance WFL     Sit to Stand Dynamic Balance mild impairment     Static Standing Balance mild impairment     Dynamic Standing Balance mild impairment     Comment, Balance supervision (A) with RW        Impairments/Safety Issues    Impairments Affecting Function pain;endurance/activity tolerance;range of motion (ROM);strength                                    Education Documentation  Unresolved/Worsening Symptoms, taught by Afshan Cabrera PT at 5/16/2024  8:41 AM.  Learner: Patient  Readiness: Acceptance  Method: Explanation, Demonstration  Response: Verbalizes Understanding, Demonstrated Understanding  Comment: reviewed PT POC, role of PT    Joint Mobility/Strength, taught by Afshan Cabrera PT at 5/16/2024  8:41 AM.  Learner: Patient  Readiness: Acceptance  Method: Explanation, Demonstration  Response: Verbalizes Understanding, Demonstrated Understanding  Comment: reviewed PT POC, role of PT    Home Safety, taught by Afshan Cabrera PT at 5/16/2024  8:41 AM.  Learner: Patient  Readiness: Acceptance  Method: Explanation, Demonstration  Response: Verbalizes Understanding, Demonstrated Understanding  Comment: reviewed PT POC, role of PT    Energy Conservation, taught by Afshan Cabrera PT at 5/16/2024  8:41 AM.  Learner: Patient  Readiness: Acceptance  Method: Explanation, Demonstration  Response: Verbalizes Understanding, Demonstrated Understanding  Comment: reviewed PT POC, role of PT    Assistive/Adaptive Devices, taught by Afshan Cabrera PT at 5/16/2024  8:41  AM.  Learner: Patient  Readiness: Acceptance  Method: Explanation, Demonstration  Response: Verbalizes Understanding, Demonstrated Understanding  Comment: reviewed PT POC, role of PT    Signs/Symptoms, taught by Afshan Cabrera PT at 5/16/2024  8:41 AM.  Learner: Patient  Readiness: Acceptance  Method: Explanation, Demonstration  Response: Verbalizes Understanding, Demonstrated Understanding  Comment: reviewed PT POC, role of PT    Risk Factors, taught by Afshan Cabrera PT at 5/16/2024  8:41 AM.  Learner: Patient  Readiness: Acceptance  Method: Explanation, Demonstration  Response: Verbalizes Understanding, Demonstrated Understanding  Comment: reviewed PT POC, role of PT        Session Outcome  Patient upright, in chair at end of session, call light in reach, personal items in reach, all needs met, chair alarm on. Nursing notified about patient's performance, patient's position, and patient's response to therapy/activity.    AM-PAC™ - Mobility (Current Function)     Turning form your back to your side while in flat bed without using bedrails 4 - None   Moving from lying on your back to sitting on the side of a flat bed without using bedrails 3 - A Little   Moving to and from a bed to a chair 3 - A Little   Standing up from a chair using your arms 3 - A Little   To walk in a hospital room 3 - A Little   Climbing 3-5 steps with a railing 3 - A Little   AM-PAC™ Mobility Score 19      ASSESSMENT AND PLAN     Progress Summary  5/16/24 Pt is a 64 year old female s/p L TKA post op day one. Pt seen for PT session. The patient was educated on the role of PT in the acute care setting. The patient was instructed on proper technique when performing OOB transfers to ensure their safety. Gait training was performed using the necessary AD along with instructing the patient regarding the proper set up and use of the device. All questions and concerns addressed and answered during session. Pt req supervision (A) for  functional txs, amb using RW and close supervision, limited by pain; pt to attend ortho class and return home at [place]    Patient/Family Therapy Goals Statement: go home    PT Plan      Flowsheet Row Most Recent Value   Rehab Potential good, to achieve stated therapy goals at 05/16/2024 0650   Therapy Frequency daily at 05/16/2024 0650   Planned Therapy Interventions transfer training, ROM (range of motion), gait training, stair training, home exercise program, patient/family education at 05/16/2024 0650            PT Discharge Recommendations      Flowsheet Row Most Recent Value   PT Recommended Discharge Disposition home with assistance at 05/16/2024 0650   Anticipated Equipment Needs if Discharged Home (PT) none at 05/16/2024 0650                 PT Goals      Flowsheet Row Most Recent Value   Transfer Goal 1    Activity/Assistive Device all transfers at 05/16/2024 0650   [place] modified independence at 05/16/2024 0650   Time Frame short-term goal (STG) at 05/16/2024 0650   Progress/Outcome goal ongoing at 05/16/2024 0650   Gait Training Goal 1    Activity/Assistive Device gait (walking locomotion) at 05/16/2024 0650   [place] modified independence at 05/16/2024 0650   Distance 100 at 05/16/2024 0650   Time Frame short-term goal (STG) at 05/16/2024 0650   Progress/Outcome goal ongoing at 05/16/2024 0650   ROM Goal 1    ROM Goal 1 AROM L knee flex ext 0-90 at 05/16/2024 0650   Time Frame short-term goal (STG) at 05/16/2024 0650   Progress/Outcome goal ongoing at 05/16/2024 0650   Stairs Goal 1    Activity/Assistive Device stairs, all skills at 05/16/2024 0650   [place] modified independence at 05/16/2024 0650   Number of Stairs 2 at 05/16/2024 0650   Time Frame short-term goal (STG) at 05/16/2024 0650   Progress/Outcome goal ongoing at 05/16/2024 0650

## 2024-05-16 NOTE — PLAN OF CARE
Care Coordination Admission Assessment Note    General Information:  Readmission Within the last 30 days: no previous admission in last 30 days  Does patient have a : No  Patient-Specific Goals (include timeframe): Return home    Living Arrangements:  Arrived From: home  Current Living Arrangements: home  People in Home: spouse  Home Accessibility: stairs to enter home (Group), stairs within home (Group)  Living Arrangement Comments: Patient resides with spouse in a 2SH w 2 VY. Full bath avail on 1st flr.    Housing Stability and Utility Access (SDOH):  In the last 12 months, was there a time when you were not able to pay the mortgage or rent on time?: No  In the last 12 months, how many places have you lived?:    In the last 12 months, was there a time when you did not have a steady place to sleep or slept in a shelter (including now)?: No  In the past 12 months has the electric, gas, oil, or water company threatened to shut off services in your home?: No    Functional Status Prior to Admission:   Assistive Device/Animal Currently Used at Home: none  Functional Status Comments: Independent w mobility.  IADL Comments: Independent w/o AD     Supports and Services:  Current Outpatient/Agency/Support Group: none  Type of Current Home Care Services:    History of home care episode or rehab stay: No hx of HH/SNF.    Discharge Needs Assessment:   Concerns to be Addressed: no discharge needs identified, denies needs/concerns at this time  Current Discharge Risk:    Anticipated Changes Related to Illness: none    Patient/Family Anticipated Discharge Plan:  Patient/Family Anticipates Transition To: home with family  Patient/Family Anticipated Services at Transition: none    Connection to Community  Not applicable    Patient Choice:   Offered/Gave Vendor List: no  Patient's Choice of Community Agency(s):         Anticipated Discharge Plan:  Met with patient. Provided education and contact information for  Care Coordination services.: yes  Anticipated Discharge Disposition: home with assistance     Transportation Needs (SDOH):  Transportation Concerns: none  Transportation Anticipated: family or friend will provide  Is Out of Hospital DNR needed at discharge?: no    In the past 12 months, has lack of transportation kept you from medical appointments or from getting medications?: No  In the past 12 months, has lack of transportation kept you from meetings, work, or from getting things needed for daily living?: No    Concerns - comments: S/p L TKA    SW met with patient at bedside. Patient confirmed address, PCP, pharmacy, and insurance. No home O2. No hx of HH/SNF. Spouse to transport home. No current discharge needs identified.     DCP  Home with assist  Spouse to transport home

## 2024-05-16 NOTE — PROGRESS NOTES
Hospital Medicine Service -  Daily Progress Note       SUBJECTIVE   Interval History:   Patient is doing ok today. She reports significant pain.  She has nausea overnight, but gone now.   She denies HA, dizziness, CP, dyspnea, abdominal pain, vomiting.      OBJECTIVE      Vital signs in last 24 hours:  Temp:  [36.1 °C (97 °F)-36.8 °C (98.3 °F)] 36.8 °C (98.3 °F)  Heart Rate:  [44-72] 49  Resp:  [9-37] 16  BP: ()/(50-90) 115/56    Intake/Output Summary (Last 24 hours) at 5/16/2024 0914  Last data filed at 5/16/2024 0908  Gross per 24 hour   Intake 2622.33 ml   Output 480 ml   Net 2142.33 ml       PHYSICAL EXAMINATION      Physical Exam  Vitals and nursing note reviewed.   Constitutional:       General: She is not in acute distress.     Appearance: Normal appearance. She is obese. She is not toxic-appearing.   HENT:      Head: Normocephalic and atraumatic.   Eyes:      Conjunctiva/sclera: Conjunctivae normal.   Cardiovascular:      Rate and Rhythm: Normal rate and regular rhythm.      Heart sounds: Normal heart sounds.   Pulmonary:      Effort: Pulmonary effort is normal. No respiratory distress.      Breath sounds: Normal breath sounds.   Abdominal:      General: Bowel sounds are normal. There is no distension.      Palpations: Abdomen is soft.      Tenderness: There is no abdominal tenderness.   Musculoskeletal:      Cervical back: Neck supple.      Comments: LLE: dressing C/D/I, neurovascularly intact distally   Skin:     General: Skin is warm and dry.   Neurological:      Mental Status: She is oriented to person, place, and time.   Psychiatric:         Mood and Affect: Mood normal.        LINES, CATHETERS, DRAINS, AIRWAYS, AND WOUNDS   Lines, Drains, and Airways:  Wounds (agree with documentation and present on admission):  Peripheral IV (Adult) 05/15/24 Posterior;Right Hand (Active)   Number of days: 1       Surgical Incision Knee Left (Active)   Number of days: 1         Comments:      LABS / IMAGING /  TELE      Labs  Results from last 7 days   Lab Units 05/16/24  0454   SODIUM mEQ/L 133*   POTASSIUM mEQ/L 4.1   CHLORIDE mEQ/L 103   CO2 mEQ/L 22   BUN mg/dL 17   CREATININE mg/dL 0.7   CALCIUM mg/dL 8.1*   GLUCOSE mg/dL 152*     Results from last 7 days   Lab Units 05/16/24  0454   WBC K/uL 9.39   HEMOGLOBIN g/dL 10.7*   HEMATOCRIT % 33.0*   PLATELETS K/uL 179     Microbiology Results       ** No results found for the last 720 hours. **          Above labs have been personally reviewed.     ECG/Telemetry  Patient is not on telemetry.    ASSESSMENT AND PLAN      * Status post total left knee replacement  Assessment & Plan  s/p LTKA on 5/15/24  - Management as per Ortho  - Pain control per Ortho  - DVT ppx per Ortho  - PT/OT  - Encourage IS  - Bowel regimen    Acute blood loss as cause of postoperative anemia  Assessment & Plan  Noted 3.3g drop in hgb; 2/2 acute expected blood loss from surgery and dilutional from IVF  Asx  - No acute intervention warranted; recommend outpatient follow-up to ensure resolution    Weight loss  Assessment & Plan  - Restart Monjouro at discharge    Hyperlipidemia  Assessment & Plan  - Continue Crestor          VTE Assessment: Padua    VTE Prophylaxis:  Current anticoagulants:    None      Code Status: Full Code      Estimated Discharge Date: 5/16/2024     Disposition Planning: as per Ortho     DERIC Chilel  5/16/2024

## 2024-07-17 ENCOUNTER — TRANSCRIBE ORDERS (OUTPATIENT)
Dept: SCHEDULING | Age: 64
End: 2024-07-17

## 2024-07-17 DIAGNOSIS — M76.821 POSTERIOR TIBIAL TENDINITIS, RIGHT LEG: Primary | ICD-10-CM

## 2024-07-27 ENCOUNTER — HOSPITAL ENCOUNTER (OUTPATIENT)
Dept: RADIOLOGY | Age: 64
Discharge: HOME | End: 2024-07-27
Attending: PODIATRIST
Payer: COMMERCIAL

## 2024-07-27 DIAGNOSIS — M76.821 POSTERIOR TIBIAL TENDINITIS, RIGHT LEG: ICD-10-CM

## 2024-08-22 ENCOUNTER — TRANSCRIBE ORDERS (OUTPATIENT)
Dept: REGISTRATION | Facility: CLINIC | Age: 64
End: 2024-08-22

## 2024-08-22 DIAGNOSIS — Z12.31 ENCOUNTER FOR SCREENING MAMMOGRAM FOR MALIGNANT NEOPLASM OF BREAST: Primary | ICD-10-CM

## 2024-08-27 ENCOUNTER — HOSPITAL ENCOUNTER (OUTPATIENT)
Dept: RADIOLOGY | Facility: CLINIC | Age: 64
Discharge: HOME | End: 2024-08-27
Attending: NURSE PRACTITIONER
Payer: COMMERCIAL

## 2024-08-27 DIAGNOSIS — Z12.31 ENCOUNTER FOR SCREENING MAMMOGRAM FOR MALIGNANT NEOPLASM OF BREAST: ICD-10-CM

## 2024-08-27 PROCEDURE — 77067 SCR MAMMO BI INCL CAD: CPT

## 2025-01-16 ENCOUNTER — TRANSCRIBE ORDERS (OUTPATIENT)
Dept: REGISTRATION | Facility: CLINIC | Age: 65
End: 2025-01-16

## 2025-01-16 DIAGNOSIS — Z13.820 ENCOUNTER FOR SCREENING FOR OSTEOPOROSIS: Primary | ICD-10-CM

## 2025-02-04 ENCOUNTER — HOSPITAL ENCOUNTER (OUTPATIENT)
Dept: RADIOLOGY | Facility: CLINIC | Age: 65
Discharge: HOME | End: 2025-02-04
Attending: NURSE PRACTITIONER
Payer: COMMERCIAL

## 2025-02-04 DIAGNOSIS — Z13.820 ENCOUNTER FOR SCREENING FOR OSTEOPOROSIS: ICD-10-CM

## 2025-02-04 PROCEDURE — 77080 DXA BONE DENSITY AXIAL: CPT

## 2025-02-13 ENCOUNTER — TRANSCRIBE ORDERS (OUTPATIENT)
Dept: SCHEDULING | Age: 65
End: 2025-02-13

## 2025-02-13 DIAGNOSIS — M76.821 POSTERIOR TIBIAL TENDINITIS, RIGHT LEG: Primary | ICD-10-CM

## 2025-02-19 ENCOUNTER — HOSPITAL ENCOUNTER (OUTPATIENT)
Dept: RADIOLOGY | Age: 65
Discharge: HOME | End: 2025-02-19
Attending: PODIATRIST
Payer: COMMERCIAL

## 2025-02-19 DIAGNOSIS — M76.821 POSTERIOR TIBIAL TENDINITIS, RIGHT LEG: ICD-10-CM

## 2025-05-08 ENCOUNTER — TRANSCRIBE ORDERS (OUTPATIENT)
Dept: SCHEDULING | Age: 65
End: 2025-05-08

## 2025-05-08 DIAGNOSIS — Z96.652 PRESENCE OF LEFT ARTIFICIAL KNEE JOINT: Primary | ICD-10-CM

## 2025-05-12 ENCOUNTER — HOSPITAL ENCOUNTER (OUTPATIENT)
Dept: RADIOLOGY | Facility: HOSPITAL | Age: 65
Setting detail: NUCLEAR MEDICINE
Discharge: HOME | End: 2025-05-12
Attending: ORTHOPAEDIC SURGERY
Payer: COMMERCIAL

## 2025-05-12 DIAGNOSIS — Z96.652 PRESENCE OF LEFT ARTIFICIAL KNEE JOINT: ICD-10-CM

## 2025-05-12 PROCEDURE — 78315 BONE IMAGING 3 PHASE: CPT

## 2025-05-12 PROCEDURE — A9503 TC99M MEDRONATE: HCPCS | Performed by: ORTHOPAEDIC SURGERY

## 2025-05-12 RX ADMIN — TECHNETIUM TC 99M MEDRONATE 29 MILLICURIE: 25 INJECTION, POWDER, FOR SOLUTION INTRAVENOUS at 07:40

## 2025-06-05 ENCOUNTER — PRE-ADMISSION TESTING (OUTPATIENT)
Dept: PREADMISSION TESTING | Age: 65
End: 2025-06-05
Payer: COMMERCIAL

## 2025-06-05 VITALS — WEIGHT: 166 LBS | BODY MASS INDEX: 27.66 KG/M2 | HEIGHT: 65 IN

## 2025-06-05 ASSESSMENT — PAIN SCALES - GENERAL: PAINLEVEL_OUTOF10: 8

## 2025-06-05 NOTE — PRE-PROCEDURE INSTRUCTIONS
[facility]  [address] [place] [address]    1.       Admissions will call you with your arrival time on  June 27, 2025 (day prior to surgery) between 2pm-4pm.  For questions about your arrival time, please call [phone].    2.       On the day of your procedure please report to the [facility] in the [place]. Please arrive through the [place] [facility]trance.  If you are parking in the [facility], come to the ground floor of the garage and follow signs to the [facility].  After being screened, please report to either the Outpatient Registration for Pre-Admission Testing or to the Surgery Registration Desk.    3. Please follow the following fasting guidelines:     No solid food EIGHT HOURS prior to arrival time on day of surgery.  6 ounces of clear liquids, meaning water or PLAIN black coffee WITHOUT any milk, cream, sugar, or sweetener are permitted up to TWO HOURS prior to arrival at the hospital.    4. Please take ONLY the following medications with a sip of water on the morning of your procedure: (populate names and/or NONE)  None   Last dose of Mounjaro  6-22-25  5. Other Instructions: You may brush your teeth the morning of the procedure. Rinse and spit, do not swallow.  Bring a list of your medications with dosages.  Use surgical wash as directed.     6. If you develop a cold, cough, fever, rash, or other symptom prior to the day of the procedure, please report it to your physician immediately.    7. If you need to cancel the procedure for any reason, please contact your physician.    8. Make arrangements to have safe transportation home accompanied by a responsible adult. If you have not arranged safe transportation home, your surgery will be cancelled. Safe transportation may include private vehicle, ride-share service, taxi and public transportation when accompanied by a responsible adult who will assist you home. A responsible adult is someone known to  you and does not include the taxi, ride-share or public transit drive transporting you.      9.  If it is medically necessary for you to have a longer stay, you will be informed as soon as the decision is made.    10. Only bring essential items to the hospital.  Do not wear or bring anything of value to the hospital including jewelry of any kind, money, or wallet. Do not wear make-up or contact lenses.  DO NOT BRING MEDICATIONS FROM HOME unless instructed to do so. DO bring your hearing aids, glasses, and a case.    11. No lotion, creams, powders, or oils on skin the morning of procedure     12. Dress in comfortable clothes.    13.  If instructed, please bring a copy of your Advanced Directive (Living Will/Durable Power of ) on the day of your procedure.     14. Ensuring your safety at all times is a very important part of our [facility] Culture of Safety. After having surgery and sedation, you are at risk for falling and balance issues. Although you may feel awake, the effects of the medication can last up to 24 hours after anesthesia. If you need to use the bathroom during your recovery period, nursing staff will escort you there and stay with you to ensure your safety.    15. Refrain from drinking alcohol and smoking cigarettes for 24 hours prior to surgery.    16. Shower with antibacterial soap (Dial) the night before and morning of your procedure.  If your procedure indicates the need for CHG antiseptic wash (Bactoshield or Hibiclens), please use this instead and follow instructions as discussed at the time of your Pre-Admission Testing phone interview or visit.  [facility]  Patient Education Preoperative Showers    Good hygiene, such as frequent handwashing and daily skin cleansing, promotes good health. Daily skin cleansing helps remove germs that may cause infections. The following instructions should be followed to help reduce germs on your skin prior to your surgical procedure.      Bactoshield/Hibiclens CHG 4% is an antiseptic soap. The active ingredient is chlorhexidine gluconate. Do not use this product if you are allergic to chlorhexidine gluconate.  The NIGHT before and the MORNING of your procedure, shower or bathe with Bactoshield. This product should replace your regular soap used for cleansing most of your body surfaces. Bactoshield should not be used on your head or face; keep out of your eyes, ears and mouth.  If you plan to wash your hair, do so with your regular shampoo. Then, rinse the hair and your body thoroughly to remove any shampoo residue.  Wash your face with regular soap and water only.  Wash your genital area with soap and water only.  Thoroughly rinse your body with warm water from the neck down.  Apply the minimum amount of Bactoshield to cover the skin. Use this product as you would any liquid soap. Leave this on for 2 minutes. NOTE- Bactoshield DOES NOT LATHER like normal soap.   Wash the skin gently and rinse thoroughly with warm water. You do not need to scrub the skin to remove germs.  Do not use regular soap after you have applied and rinsed the Bactoshield.  Change into clean clothes after each shower.   Do not apply any lotions, powders, or perfumes to the body areas that have been cleansed with Bactoshield.  No use of hair removal products or shaving at or near the surgical site 48 hours before your procedure. (72 hours for cardiac patients.)  For those having perineal surgeries (i.e.: vaginal, rectal or cystoscopy) - please use Dial soap.  Above instructions reviewed with patient and patient acknowledges understanding.    Form explained by: Maral Calderon RN

## 2025-06-13 ENCOUNTER — HOSPITAL ENCOUNTER (OUTPATIENT)
Dept: CARDIOLOGY | Facility: HOSPITAL | Age: 65
Discharge: HOME | End: 2025-06-13
Attending: ORTHOPAEDIC SURGERY
Payer: COMMERCIAL

## 2025-06-13 ENCOUNTER — PRE-ADMISSION TESTING (OUTPATIENT)
Dept: PREADMISSION TESTING | Facility: HOSPITAL | Age: 65
End: 2025-06-13
Attending: ORTHOPAEDIC SURGERY
Payer: COMMERCIAL

## 2025-06-13 VITALS
DIASTOLIC BLOOD PRESSURE: 72 MMHG | RESPIRATION RATE: 16 BRPM | HEIGHT: 65 IN | WEIGHT: 166.1 LBS | SYSTOLIC BLOOD PRESSURE: 130 MMHG | OXYGEN SATURATION: 98 % | BODY MASS INDEX: 27.67 KG/M2 | HEART RATE: 75 BPM | TEMPERATURE: 97.5 F

## 2025-06-13 DIAGNOSIS — R63.4 WEIGHT LOSS: ICD-10-CM

## 2025-06-13 DIAGNOSIS — Z01.818 ENCOUNTER FOR PRE-OPERATIVE EXAMINATION: Primary | ICD-10-CM

## 2025-06-13 DIAGNOSIS — Z01.818 PREOP EXAMINATION: ICD-10-CM

## 2025-06-13 DIAGNOSIS — T84.023A: ICD-10-CM

## 2025-06-13 PROBLEM — G47.00 INSOMNIA: Status: ACTIVE | Noted: 2025-06-13

## 2025-06-13 LAB
ABO + RH BLD: NORMAL
BLD GP AB SCN SERPL QL: NEGATIVE
BLOOD BANK CMNT PATIENT-IMP: NORMAL
D AG BLD QL: POSITIVE
LABORATORY COMMENT REPORT: NORMAL
SPECIMEN EXP DATE BLD: NORMAL

## 2025-06-13 PROCEDURE — 36415 COLL VENOUS BLD VENIPUNCTURE: CPT

## 2025-06-13 PROCEDURE — 99214 OFFICE O/P EST MOD 30 MIN: CPT | Performed by: HOSPITALIST

## 2025-06-13 PROCEDURE — 86901 BLOOD TYPING SEROLOGIC RH(D): CPT

## 2025-06-13 NOTE — CONSULTS
Division of [facility] Hospital Medicine -  Pre-Operative Consultation       Patient Name: Maral Nieves  Referring Surgeon: Dr. Raven Martinez    Reason for Referral: Pre-Operative Evaluation  Surgical Procedure: Revision Left Total Knee Arthroplasty   Operative Date: 06/30/2025  Other Providers:      PCP: Keyonna Zhang CRNP          HISTORY OF PRESENT ILLNESS      Maral Nieves is a 65 y.o. female presenting today to the [facility] at [facility] for pre-operative evaluation prior to planned surgery.    Complains of progressive right knee pain.  No relief with conservative measures and pain impacting her mobility, day-to-day activities so opting for surgery.         In regards to medical history:      The patient denies any current or recent chest pain or pressure, dyspnea, cough, sputum, fevers, chills, abdominal pain, nausea, vomiting, diarrhea or other symptoms.  No urinary symptoms, no bright red blood per rectum or melena.    Functionally, the patient is able to ascend a flight or so of stairs with no dyspnea or chest pain.     The patient denies, on specific questioning, the following:  No history of MI, arrhythmia,or CHF.  No history of SON.  No history of DVT/PE.  No history of COPD.  No history of CVA.  No history of DM.   No history of CKD.     PAST MEDICAL AND SURGICAL HISTORY      Past Medical History:   Diagnosis Date    Family history of bleeding or clotting disorder     mother and sister have factor 5 Leiden/ patient tested negative    Herniated disc, cervical     Lipid disorder     Lumbar herniated disc     OA (osteoarthritis)     knees, feet, neck, back and hands    Seasonal allergies        Past Surgical History   Procedure Laterality Date    Hysterectomy      KNEE ARTHROPLASTY TOTAL Left 5/15/2024    Performed by Bobby Cannon MD at [facility]    Knee arthroscopy Right     Other surgical history      I&D pilonidal cyst     "Septoplasty      Tonsillectomy      Tubal ligation         MEDICATIONS        Current Outpatient Medications:     cholecalciferol, vitamin D3, (VITAMIN D3) 50 mcg (2,000 unit) tablet, Take 2,000 Units by mouth every evening., Disp: , Rfl:     rosuvastatin (CRESTOR) 10 mg tablet, Take 10 mg by mouth nightly., Disp: , Rfl:     tirzepatide (MOUNJARO) 15 mg/0.5 mL pen injector, Inject 7.5 mg under the skin every (seven) 7 days. Starting Sunday after surgery- last dose will be  6-22-25, Disp: , Rfl:     zolpidem (AMBIEN) 10 mg tablet, Take 1 tablet (10 mg total) by mouth nightly for 5 days. Hold while taking narcotic pain medication - may cause excessive lethargy, Disp: , Rfl:     ALLERGIES      Percocet [oxycodone-acetaminophen]    FAMILY HISTORY      family history includes Breast cancer in her cousin and mother's sister; Colon cancer in her biological sister; Lung cancer in her biological sister; Parkinsonism in her biological father.    Denies any prior known family history of DVTs/PEs/clotting disorder    SOCIAL HISTORY      Social History     Tobacco Use    Smoking status: Never    Smokeless tobacco: Never   Vaping Use    Vaping status: Never Used   Substance Use Topics    Alcohol use: Not Currently    Drug use: Never       REVIEW OF SYSTEMS      All other systems reviewed and negative except as noted in HPI    PHYSICAL EXAMINATION      Visit Vitals  /72 (BP Location: Right upper arm, Patient Position: Sitting)   Pulse 75   Temp 36.4 °C (97.5 °F) (Temporal)   Resp 16   Ht 1.651 m (5' 5\")   Wt 75.3 kg (166 lb 1.6 oz)   SpO2 98%   BMI 27.64 kg/m²     Body mass index is 27.64 kg/m².    Physical Exam  Constitutional:       Appearance: She is obese.   HENT:      Head: Normocephalic and atraumatic.   Eyes:      Conjunctiva/sclera: Conjunctivae normal.   Cardiovascular:      Rate and Rhythm: Normal rate and regular rhythm.      Heart sounds: Normal heart sounds.   Pulmonary:      Effort: Pulmonary effort is normal. "      Breath sounds: Normal breath sounds.   Abdominal:      General: Bowel sounds are normal. There is no distension.      Palpations: Abdomen is soft.      Tenderness: There is no abdominal tenderness.   Musculoskeletal:      Cervical back: Neck supple.      Comments: Right knee limited ROM from pain.   Skin:     General: Skin is warm and dry.   Neurological:      Mental Status: She is alert and oriented to person, place, and time.   Psychiatric:         Mood and Affect: Mood normal.         Behavior: Behavior normal.         LABS / EKG        Labs  CBC and CMP from outside labs reviewed -within normal limits    Lab Results   Component Value Date     (L) 05/16/2024    K 4.1 05/16/2024     05/16/2024    BUN 17 05/16/2024    CREATININE 0.7 05/16/2024    WBC 9.39 05/16/2024    HGB 10.7 (L) 05/16/2024    HCT 33.0 (L) 05/16/2024     05/16/2024         ECG/Telemetry  ECG from PCP personally reviewed: Normal sinus rhythm    ASSESSMENT AND PLAN         Encounter for pre-operative examination  Reports good exercise capacity able to reach greater than 4 mets activity levels.  No cardiac history and patient exhibits no signs/symptoms suggestive of angina, arrythmias or CHF.   no hx of TIA/CVA  ECG without signs of ischemia.  Preop labs within acceptable limits.    P:  Can proceed without additional testing at an acceptable risk for this intermediate risk surgery  NSAID use preop per surgery team recommendations    Hyperlipidemia  Continue statin    Weight loss  On Mounjaro for weight loss  Patient instructed to hold med 1 week preop per anesthesia guidelines  Can be resumed postop upon discharge.    Insomnia  On Ambien  Caution with narcotic use postop to avoid oversedation       In regards to perioperative cardiac risk:  Regarding perioperative cardiovascular risk:  The patient has the following risk factors: none.  This correlates to a rCRI score of 0 with perioperative cardiac event risk of  0.4%.      Further comments:  Resume supplements when OK with surgical team.  I would encourage incentive spirometry to assist with minimizing shabbir-operative pulmonary risk.  DVT prophylaxis and timing of such per the discretion of the surgeon.     Please do not hesitate to contact [facility] during the upcoming hospitalization with any questions or concerns.     Ye Beebe MD  6/13/2025

## 2025-06-13 NOTE — ASSESSMENT & PLAN NOTE
On Mounjaro for weight loss  Patient instructed to hold med 1 week preop per anesthesia guidelines  Can be resumed postop upon discharge.

## 2025-06-13 NOTE — ASSESSMENT & PLAN NOTE
Reports good exercise capacity able to reach greater than 4 mets activity levels.  No cardiac history and patient exhibits no signs/symptoms suggestive of angina, arrythmias or CHF.   no hx of TIA/CVA  ECG without signs of ischemia.  Preop labs within acceptable limits.    P:  Can proceed without additional testing at an acceptable risk for this intermediate risk surgery  NSAID use preop per surgery team recommendations

## 2025-06-13 NOTE — H&P (VIEW-ONLY)
Division of [facility] Medicine -  Pre-Operative Consultation       Patient Name: Maral Nieves  Referring Surgeon: Dr. Raven Martinez    Reason for Referral: Pre-Operative Evaluation  Surgical Procedure: Revision Left Total Knee Arthroplasty   Operative Date: 06/30/2025  Other Providers:      PCP: Keyonna Zhang CRNP          HISTORY OF PRESENT ILLNESS      Maral Nieves is a 65 y.o. female presenting today to the [facility] at [facility] for pre-operative evaluation prior to planned surgery.    Complains of progressive right knee pain.  No relief with conservative measures and pain impacting her mobility, day-to-day activities so opting for surgery.         In regards to medical history:      The patient denies any current or recent chest pain or pressure, dyspnea, cough, sputum, fevers, chills, abdominal pain, nausea, vomiting, diarrhea or other symptoms.  No urinary symptoms, no bright red blood per rectum or melena.    Functionally, the patient is able to ascend a flight or so of stairs with no dyspnea or chest pain.     The patient denies, on specific questioning, the following:  No history of MI, arrhythmia,or CHF.  No history of SON.  No history of DVT/PE.  No history of COPD.  No history of CVA.  No history of DM.   No history of CKD.     PAST MEDICAL AND SURGICAL HISTORY      Past Medical History:   Diagnosis Date    Family history of bleeding or clotting disorder     mother and sister have factor 5 Leiden/ patient tested negative    Herniated disc, cervical     Lipid disorder     Lumbar herniated disc     OA (osteoarthritis)     knees, feet, neck, back and hands    Seasonal allergies        Past Surgical History   Procedure Laterality Date    Hysterectomy      KNEE ARTHROPLASTY TOTAL Left 5/15/2024    Performed by Bobby Cannon MD at [facility] OR [facility]    Knee arthroscopy Right     Other surgical history      I&D pilonidal cyst     "Septoplasty      Tonsillectomy      Tubal ligation         MEDICATIONS        Current Outpatient Medications:     cholecalciferol, vitamin D3, (VITAMIN D3) 50 mcg (2,000 unit) tablet, Take 2,000 Units by mouth every evening., Disp: , Rfl:     rosuvastatin (CRESTOR) 10 mg tablet, Take 10 mg by mouth nightly., Disp: , Rfl:     tirzepatide (MOUNJARO) 15 mg/0.5 mL pen injector, Inject 7.5 mg under the skin every (seven) 7 days. Starting Sunday after surgery- last dose will be  6-22-25, Disp: , Rfl:     zolpidem (AMBIEN) 10 mg tablet, Take 1 tablet (10 mg total) by mouth nightly for 5 days. Hold while taking narcotic pain medication - may cause excessive lethargy, Disp: , Rfl:     ALLERGIES      Percocet [oxycodone-acetaminophen]    FAMILY HISTORY      family history includes Breast cancer in her cousin and mother's sister; Colon cancer in her biological sister; Lung cancer in her biological sister; Parkinsonism in her biological father.    Denies any prior known family history of DVTs/PEs/clotting disorder    SOCIAL HISTORY      Social History     Tobacco Use    Smoking status: Never    Smokeless tobacco: Never   Vaping Use    Vaping status: Never Used   Substance Use Topics    Alcohol use: Not Currently    Drug use: Never       REVIEW OF SYSTEMS      All other systems reviewed and negative except as noted in HPI    PHYSICAL EXAMINATION      Visit Vitals  /72 (BP Location: Right upper arm, Patient Position: Sitting)   Pulse 75   Temp 36.4 °C (97.5 °F) (Temporal)   Resp 16   Ht 1.651 m (5' 5\")   Wt 75.3 kg (166 lb 1.6 oz)   SpO2 98%   BMI 27.64 kg/m²     Body mass index is 27.64 kg/m².    Physical Exam  Constitutional:       Appearance: She is obese.   HENT:      Head: Normocephalic and atraumatic.   Eyes:      Conjunctiva/sclera: Conjunctivae normal.   Cardiovascular:      Rate and Rhythm: Normal rate and regular rhythm.      Heart sounds: Normal heart sounds.   Pulmonary:      Effort: Pulmonary effort is normal. "      Breath sounds: Normal breath sounds.   Abdominal:      General: Bowel sounds are normal. There is no distension.      Palpations: Abdomen is soft.      Tenderness: There is no abdominal tenderness.   Musculoskeletal:      Cervical back: Neck supple.      Comments: Right knee limited ROM from pain.   Skin:     General: Skin is warm and dry.   Neurological:      Mental Status: She is alert and oriented to person, place, and time.   Psychiatric:         Mood and Affect: Mood normal.         Behavior: Behavior normal.         LABS / EKG        Labs  CBC and CMP from outside labs reviewed -within normal limits    Lab Results   Component Value Date     (L) 05/16/2024    K 4.1 05/16/2024     05/16/2024    BUN 17 05/16/2024    CREATININE 0.7 05/16/2024    WBC 9.39 05/16/2024    HGB 10.7 (L) 05/16/2024    HCT 33.0 (L) 05/16/2024     05/16/2024         ECG/Telemetry  ECG from PCP personally reviewed: Normal sinus rhythm    ASSESSMENT AND PLAN         Encounter for pre-operative examination  Reports good exercise capacity able to reach greater than 4 mets activity levels.  No cardiac history and patient exhibits no signs/symptoms suggestive of angina, arrythmias or CHF.   no hx of TIA/CVA  ECG without signs of ischemia.  Preop labs within acceptable limits.    P:  Can proceed without additional testing at an acceptable risk for this intermediate risk surgery  NSAID use preop per surgery team recommendations    Hyperlipidemia  Continue statin    Weight loss  On Mounjaro for weight loss  Patient instructed to hold med 1 week preop per anesthesia guidelines  Can be resumed postop upon discharge.    Insomnia  On Ambien  Caution with narcotic use postop to avoid oversedation       In regards to perioperative cardiac risk:  Regarding perioperative cardiovascular risk:  The patient has the following risk factors: none.  This correlates to a rCRI score of 0 with perioperative cardiac event risk of  0.4%.      Further comments:  Resume supplements when OK with surgical team.  I would encourage incentive spirometry to assist with minimizing shabbir-operative pulmonary risk.  DVT prophylaxis and timing of such per the discretion of the surgeon.     Please do not hesitate to contact [facility] during the upcoming hospitalization with any questions or concerns.     Ye Beebe MD  6/13/2025

## 2025-06-19 NOTE — DISCHARGE INSTRUCTIONS
Please see Dr. Martinez's printed instructions from the office for further information on your recovery.    DVT Prophylaxis:  -Continue with Aspirin 81mg by mouth twice daily X 4 weeks for blood clot prevention.    Constipation/ Pain Med:   - Take your pain medication with food to prevent nausea  - Do not drive while taking pain medications.   - Pain medications may cause constipation.   - Drink plenty of fluids and eat fresh fruits and vegetables.  - Please take Senna-Colace as prescribed. Hold for loose stool. If you are having difficulties having a bowel movement or haven't had bowel movement in 2 days, please add over the counter miralax and take as directed on package.   - If you have not had a bowel movement in three days please call the office.    Incision Care:   - On July 7, you may take off your dressing and leave your incision open to air.   - However, if there is any drainage please keep covered with gauze and tape.  - Please keep your incision(s) clean and dry  - Make sure your incision(s) are checked at least twice daily for signs and symptoms of infection.   If any of the below should occur, please call the office:  - Drainage from incision site  - Opening of incisions  - Fevers greater than 101  - Flu-like symptoms  - Increased redness and/or tenderness  Please call office or go to emergency department if :  - Thigh/calf pain or swelling in legs  - Chest pain  - Chest congestion  - Problems with breathing.

## 2025-06-26 RX ORDER — TRANEXAMIC ACID 10 MG/ML
1000 INJECTION, SOLUTION INTRAVENOUS ONCE
Status: COMPLETED | OUTPATIENT
Start: 2025-06-30 | End: 2025-06-30

## 2025-06-30 ENCOUNTER — ANESTHESIA EVENT (OUTPATIENT)
Dept: OPERATING ROOM | Facility: HOSPITAL | Age: 65
Setting detail: HOSPITAL OUTPATIENT SURGERY
End: 2025-06-30
Payer: COMMERCIAL

## 2025-06-30 ENCOUNTER — HOSPITAL ENCOUNTER (OUTPATIENT)
Facility: HOSPITAL | Age: 65
Discharge: HOME | End: 2025-07-01
Attending: ORTHOPAEDIC SURGERY | Admitting: ORTHOPAEDIC SURGERY
Payer: COMMERCIAL

## 2025-06-30 ENCOUNTER — APPOINTMENT (OUTPATIENT)
Dept: RADIOLOGY | Facility: HOSPITAL | Age: 65
End: 2025-06-30
Attending: NURSE PRACTITIONER
Payer: COMMERCIAL

## 2025-06-30 DIAGNOSIS — Z96.652 S/P REVISION OF TOTAL KNEE, LEFT: Primary | ICD-10-CM

## 2025-06-30 DIAGNOSIS — T84.023A INSTABILITY OF INTERNAL LEFT KNEE PROSTHESIS, INITIAL ENCOUNTER (CMS/HCC): ICD-10-CM

## 2025-06-30 PROBLEM — Z96.642: Status: ACTIVE | Noted: 2025-06-30

## 2025-06-30 PROCEDURE — 73560 X-RAY EXAM OF KNEE 1 OR 2: CPT | Mod: LT

## 2025-06-30 PROCEDURE — 25800000 HC PHARMACY IV SOLUTIONS: Performed by: NURSE PRACTITIONER

## 2025-06-30 PROCEDURE — 36000005 HC OR LEVEL 5 INITIAL 30MIN: Performed by: ORTHOPAEDIC SURGERY

## 2025-06-30 PROCEDURE — 63600000 HC DRUGS/DETAIL CODE: Mod: JZ | Performed by: ANESTHESIOLOGY

## 2025-06-30 PROCEDURE — 27200000 HC STERILE SUPPLY: Performed by: ORTHOPAEDIC SURGERY

## 2025-06-30 PROCEDURE — 63600000 HC DRUGS/DETAIL CODE: Mod: JZ

## 2025-06-30 PROCEDURE — C1713 ANCHOR/SCREW BN/BN,TIS/BN: HCPCS | Performed by: ORTHOPAEDIC SURGERY

## 2025-06-30 PROCEDURE — 37000010 HC ANESTHESIA SPINAL: Performed by: ORTHOPAEDIC SURGERY

## 2025-06-30 PROCEDURE — 87205 SMEAR GRAM STAIN: CPT | Performed by: ORTHOPAEDIC SURGERY

## 2025-06-30 PROCEDURE — 25000000 HC PHARMACY GENERAL: Performed by: ORTHOPAEDIC SURGERY

## 2025-06-30 PROCEDURE — 25800000 HC PHARMACY IV SOLUTIONS: Performed by: ANESTHESIOLOGY

## 2025-06-30 PROCEDURE — C1776 JOINT DEVICE (IMPLANTABLE): HCPCS | Performed by: ORTHOPAEDIC SURGERY

## 2025-06-30 PROCEDURE — 71000011 HC PACU PHASE 1 EA ADDL MIN: Performed by: ORTHOPAEDIC SURGERY

## 2025-06-30 PROCEDURE — 99232 SBSQ HOSP IP/OBS MODERATE 35: CPT | Performed by: HOSPITALIST

## 2025-06-30 PROCEDURE — 0SRD0J9 REPLACEMENT OF LEFT KNEE JOINT WITH SYNTHETIC SUBSTITUTE, CEMENTED, OPEN APPROACH: ICD-10-PCS | Performed by: ORTHOPAEDIC SURGERY

## 2025-06-30 PROCEDURE — 25800000 HC PHARMACY IV SOLUTIONS: Performed by: ORTHOPAEDIC SURGERY

## 2025-06-30 PROCEDURE — 0SPD0JZ REMOVAL OF SYNTHETIC SUBSTITUTE FROM LEFT KNEE JOINT, OPEN APPROACH: ICD-10-PCS | Performed by: ORTHOPAEDIC SURGERY

## 2025-06-30 PROCEDURE — 71000001 HC PACU PHASE 1 INITIAL 30MIN: Performed by: ORTHOPAEDIC SURGERY

## 2025-06-30 PROCEDURE — 63600000 HC DRUGS/DETAIL CODE: Mod: JZ | Performed by: ORTHOPAEDIC SURGERY

## 2025-06-30 PROCEDURE — 87070 CULTURE OTHR SPECIMN AEROBIC: CPT | Performed by: ORTHOPAEDIC SURGERY

## 2025-06-30 PROCEDURE — 63700000 HC SELF-ADMINISTRABLE DRUG: Performed by: NURSE PRACTITIONER

## 2025-06-30 PROCEDURE — 36000015 HC OR LEVEL 5 EA ADDL MIN: Performed by: ORTHOPAEDIC SURGERY

## 2025-06-30 PROCEDURE — 63700000 HC SELF-ADMINISTRABLE DRUG

## 2025-06-30 PROCEDURE — 63600000 HC DRUGS/DETAIL CODE: Mod: JZ | Performed by: NURSE PRACTITIONER

## 2025-06-30 DEVICE — IMPLANTABLE DEVICE: Type: IMPLANTABLE DEVICE | Site: KNEE | Status: FUNCTIONAL

## 2025-06-30 DEVICE — CANAL -CEMENTED
Type: IMPLANTABLE DEVICE | Site: KNEE | Status: FUNCTIONAL
Brand: BONE PLUG

## 2025-06-30 DEVICE — CEMENT BONE SIMPLEX W/TOBRAMYCIN: Type: IMPLANTABLE DEVICE | Site: KNEE | Status: FUNCTIONAL

## 2025-06-30 RX ORDER — SODIUM CHLORIDE 9 MG/ML
INJECTION, SOLUTION INTRAVENOUS CONTINUOUS
Status: DISCONTINUED | OUTPATIENT
Start: 2025-06-30 | End: 2025-07-01 | Stop reason: HOSPADM

## 2025-06-30 RX ORDER — DEXAMETHASONE SODIUM PHOSPHATE 4 MG/ML
INJECTION, SOLUTION INTRA-ARTICULAR; INTRALESIONAL; INTRAMUSCULAR; INTRAVENOUS; SOFT TISSUE AS NEEDED
Status: DISCONTINUED | OUTPATIENT
Start: 2025-06-30 | End: 2025-06-30 | Stop reason: SURG

## 2025-06-30 RX ORDER — HYDRALAZINE HYDROCHLORIDE 20 MG/ML
5 INJECTION INTRAMUSCULAR; INTRAVENOUS
Status: DISCONTINUED | OUTPATIENT
Start: 2025-06-30 | End: 2025-06-30 | Stop reason: HOSPADM

## 2025-06-30 RX ORDER — CELECOXIB 200 MG/1
CAPSULE ORAL
Status: COMPLETED
Start: 2025-06-30 | End: 2025-06-30

## 2025-06-30 RX ORDER — KETOROLAC TROMETHAMINE 15 MG/ML
15 INJECTION, SOLUTION INTRAMUSCULAR; INTRAVENOUS ONCE
Status: COMPLETED | OUTPATIENT
Start: 2025-06-30 | End: 2025-06-30

## 2025-06-30 RX ORDER — ATROPINE SULFATE 0.4 MG/ML
0.2 INJECTION, SOLUTION ENDOTRACHEAL; INTRAMEDULLARY; INTRAMUSCULAR; INTRAVENOUS; SUBCUTANEOUS EVERY 5 MIN PRN
Status: DISCONTINUED | OUTPATIENT
Start: 2025-06-30 | End: 2025-06-30 | Stop reason: HOSPADM

## 2025-06-30 RX ORDER — OXYCODONE HYDROCHLORIDE 5 MG/1
10 TABLET ORAL EVERY 4 HOURS PRN
Status: DISCONTINUED | OUTPATIENT
Start: 2025-06-30 | End: 2025-07-01 | Stop reason: HOSPADM

## 2025-06-30 RX ORDER — ALUMINUM HYDROXIDE, MAGNESIUM HYDROXIDE, AND SIMETHICONE 1200; 120; 1200 MG/30ML; MG/30ML; MG/30ML
30 SUSPENSION ORAL EVERY 4 HOURS PRN
Status: DISCONTINUED | OUTPATIENT
Start: 2025-06-30 | End: 2025-07-01 | Stop reason: HOSPADM

## 2025-06-30 RX ORDER — DIPHENHYDRAMINE HCL 50 MG/ML
25 VIAL (ML) INJECTION EVERY 6 HOURS PRN
Status: DISCONTINUED | OUTPATIENT
Start: 2025-06-30 | End: 2025-07-01 | Stop reason: HOSPADM

## 2025-06-30 RX ORDER — FENTANYL CITRATE 50 UG/ML
50 INJECTION, SOLUTION INTRAMUSCULAR; INTRAVENOUS EVERY 5 MIN PRN
Status: COMPLETED | OUTPATIENT
Start: 2025-06-30 | End: 2025-06-30

## 2025-06-30 RX ORDER — ONDANSETRON HYDROCHLORIDE 2 MG/ML
4 INJECTION, SOLUTION INTRAVENOUS
Status: DISCONTINUED | OUTPATIENT
Start: 2025-06-30 | End: 2025-06-30 | Stop reason: HOSPADM

## 2025-06-30 RX ORDER — HYDROMORPHONE HYDROCHLORIDE 1 MG/ML
0.5 INJECTION, SOLUTION INTRAMUSCULAR; INTRAVENOUS; SUBCUTANEOUS
Status: DISCONTINUED | OUTPATIENT
Start: 2025-06-30 | End: 2025-06-30 | Stop reason: HOSPADM

## 2025-06-30 RX ORDER — ACETAMINOPHEN 325 MG/1
975 TABLET ORAL
Status: COMPLETED | OUTPATIENT
Start: 2025-06-30 | End: 2025-06-30

## 2025-06-30 RX ORDER — SODIUM CHLORIDE 9 MG/ML
INJECTION, SOLUTION INTRAVENOUS CONTINUOUS PRN
Status: DISCONTINUED | OUTPATIENT
Start: 2025-06-30 | End: 2025-06-30 | Stop reason: SURG

## 2025-06-30 RX ORDER — AMOXICILLIN 250 MG
1 CAPSULE ORAL 2 TIMES DAILY
Status: DISCONTINUED | OUTPATIENT
Start: 2025-06-30 | End: 2025-07-01 | Stop reason: HOSPADM

## 2025-06-30 RX ORDER — POLYETHYLENE GLYCOL 3350 17 G/17G
17 POWDER, FOR SOLUTION ORAL DAILY
Status: DISCONTINUED | OUTPATIENT
Start: 2025-06-30 | End: 2025-07-01 | Stop reason: HOSPADM

## 2025-06-30 RX ORDER — DEXTROSE 50 % IN WATER (D50W) INTRAVENOUS SYRINGE
25 AS NEEDED
Status: DISCONTINUED | OUTPATIENT
Start: 2025-06-30 | End: 2025-07-01 | Stop reason: HOSPADM

## 2025-06-30 RX ORDER — ACETAMINOPHEN 325 MG/1
650 TABLET ORAL
Status: DISCONTINUED | OUTPATIENT
Start: 2025-06-30 | End: 2025-07-01 | Stop reason: HOSPADM

## 2025-06-30 RX ORDER — FENTANYL CITRATE 50 UG/ML
INJECTION, SOLUTION INTRAMUSCULAR; INTRAVENOUS AS NEEDED
Status: DISCONTINUED | OUTPATIENT
Start: 2025-06-30 | End: 2025-06-30 | Stop reason: SURG

## 2025-06-30 RX ORDER — PREGABALIN 75 MG/1
CAPSULE ORAL
Status: COMPLETED
Start: 2025-06-30 | End: 2025-06-30

## 2025-06-30 RX ORDER — DEXTROSE 50 % IN WATER (D50W) INTRAVENOUS SYRINGE
25 AS NEEDED
Status: DISCONTINUED | OUTPATIENT
Start: 2025-06-30 | End: 2025-06-30 | Stop reason: HOSPADM

## 2025-06-30 RX ORDER — OXYCODONE HYDROCHLORIDE 5 MG/1
5 TABLET ORAL EVERY 4 HOURS PRN
Status: DISCONTINUED | OUTPATIENT
Start: 2025-06-30 | End: 2025-07-01 | Stop reason: HOSPADM

## 2025-06-30 RX ORDER — DEXTROSE 40 %
15-30 GEL (GRAM) ORAL AS NEEDED
Status: DISCONTINUED | OUTPATIENT
Start: 2025-06-30 | End: 2025-07-01 | Stop reason: HOSPADM

## 2025-06-30 RX ORDER — LABETALOL HCL 20 MG/4 ML
5 SYRINGE (ML) INTRAVENOUS AS NEEDED
Status: DISCONTINUED | OUTPATIENT
Start: 2025-06-30 | End: 2025-06-30 | Stop reason: HOSPADM

## 2025-06-30 RX ORDER — ONDANSETRON HYDROCHLORIDE 2 MG/ML
INJECTION, SOLUTION INTRAVENOUS AS NEEDED
Status: DISCONTINUED | OUTPATIENT
Start: 2025-06-30 | End: 2025-06-30 | Stop reason: SURG

## 2025-06-30 RX ORDER — ROPIVACAINE HYDROCHLORIDE 5 MG/ML
INJECTION, SOLUTION EPIDURAL; INFILTRATION; PERINEURAL
Status: DISCONTINUED | OUTPATIENT
Start: 2025-06-30 | End: 2025-06-30 | Stop reason: HOSPADM

## 2025-06-30 RX ORDER — EPINEPHRINE 1 MG/ML
INJECTION, SOLUTION INTRAMUSCULAR; SUBCUTANEOUS
Status: DISPENSED
Start: 2025-06-30 | End: 2025-06-30

## 2025-06-30 RX ORDER — ADHESIVE BANDAGE 7/8"
15-30 BANDAGE TOPICAL AS NEEDED
Status: DISCONTINUED | OUTPATIENT
Start: 2025-06-30 | End: 2025-07-01 | Stop reason: HOSPADM

## 2025-06-30 RX ORDER — PREGABALIN 75 MG/1
75 CAPSULE ORAL
Status: COMPLETED | OUTPATIENT
Start: 2025-06-30 | End: 2025-06-30

## 2025-06-30 RX ORDER — PROPOFOL 10 MG/ML
INJECTION, EMULSION INTRAVENOUS CONTINUOUS PRN
Status: DISCONTINUED | OUTPATIENT
Start: 2025-06-30 | End: 2025-06-30 | Stop reason: SURG

## 2025-06-30 RX ORDER — DEXAMETHASONE SODIUM PHOSPHATE 4 MG/ML
8 INJECTION, SOLUTION INTRA-ARTICULAR; INTRALESIONAL; INTRAMUSCULAR; INTRAVENOUS; SOFT TISSUE ONCE
Status: COMPLETED | OUTPATIENT
Start: 2025-07-01 | End: 2025-07-01

## 2025-06-30 RX ORDER — MIDAZOLAM HYDROCHLORIDE 2 MG/2ML
1 INJECTION, SOLUTION INTRAMUSCULAR; INTRAVENOUS AS NEEDED
Status: DISCONTINUED | OUTPATIENT
Start: 2025-06-30 | End: 2025-06-30 | Stop reason: HOSPADM

## 2025-06-30 RX ORDER — ONDANSETRON 4 MG/1
4 TABLET, ORALLY DISINTEGRATING ORAL EVERY 8 HOURS PRN
Status: DISCONTINUED | OUTPATIENT
Start: 2025-06-30 | End: 2025-07-01 | Stop reason: HOSPADM

## 2025-06-30 RX ORDER — BUPIVACAINE HYDROCHLORIDE 7.5 MG/ML
INJECTION INTRAVENOUS
Status: COMPLETED | OUTPATIENT
Start: 2025-06-30 | End: 2025-06-30

## 2025-06-30 RX ORDER — ONDANSETRON HYDROCHLORIDE 2 MG/ML
4 INJECTION, SOLUTION INTRAVENOUS EVERY 8 HOURS PRN
Status: DISCONTINUED | OUTPATIENT
Start: 2025-06-30 | End: 2025-07-01 | Stop reason: HOSPADM

## 2025-06-30 RX ORDER — BISACODYL 10 MG/1
10 SUPPOSITORY RECTAL DAILY PRN
Status: DISCONTINUED | OUTPATIENT
Start: 2025-06-30 | End: 2025-07-01 | Stop reason: HOSPADM

## 2025-06-30 RX ORDER — EPHEDRINE SULFATE/0.9% NACL/PF 50 MG/5 ML
10 SYRINGE (ML) INTRAVENOUS AS NEEDED
Status: DISCONTINUED | OUTPATIENT
Start: 2025-06-30 | End: 2025-06-30 | Stop reason: HOSPADM

## 2025-06-30 RX ORDER — DEXTROSE 40 %
15-30 GEL (GRAM) ORAL AS NEEDED
Status: DISCONTINUED | OUTPATIENT
Start: 2025-06-30 | End: 2025-06-30 | Stop reason: HOSPADM

## 2025-06-30 RX ORDER — CELECOXIB 200 MG/1
400 CAPSULE ORAL
Status: COMPLETED | OUTPATIENT
Start: 2025-06-30 | End: 2025-06-30

## 2025-06-30 RX ORDER — ACETAMINOPHEN 325 MG/1
TABLET ORAL
Status: COMPLETED
Start: 2025-06-30 | End: 2025-06-30

## 2025-06-30 RX ORDER — ROSUVASTATIN CALCIUM 10 MG/1
10 TABLET, COATED ORAL NIGHTLY
Status: DISCONTINUED | OUTPATIENT
Start: 2025-06-30 | End: 2025-07-01 | Stop reason: HOSPADM

## 2025-06-30 RX ORDER — ADHESIVE BANDAGE 7/8"
15-30 BANDAGE TOPICAL AS NEEDED
Status: DISCONTINUED | OUTPATIENT
Start: 2025-06-30 | End: 2025-06-30 | Stop reason: HOSPADM

## 2025-06-30 RX ORDER — SODIUM CHLORIDE, SODIUM GLUCONATE, SODIUM ACETATE, POTASSIUM CHLORIDE AND MAGNESIUM CHLORIDE 30; 37; 368; 526; 502 MG/100ML; MG/100ML; MG/100ML; MG/100ML; MG/100ML
125 INJECTION, SOLUTION INTRAVENOUS CONTINUOUS
Status: DISCONTINUED | OUTPATIENT
Start: 2025-06-30 | End: 2025-07-01 | Stop reason: HOSPADM

## 2025-06-30 RX ORDER — MIDAZOLAM HYDROCHLORIDE 2 MG/2ML
INJECTION, SOLUTION INTRAMUSCULAR; INTRAVENOUS AS NEEDED
Status: DISCONTINUED | OUTPATIENT
Start: 2025-06-30 | End: 2025-06-30 | Stop reason: SURG

## 2025-06-30 RX ORDER — DIPHENHYDRAMINE HCL 25 MG
25 CAPSULE ORAL EVERY 6 HOURS PRN
Status: DISCONTINUED | OUTPATIENT
Start: 2025-06-30 | End: 2025-07-01 | Stop reason: HOSPADM

## 2025-06-30 RX ORDER — NAPROXEN SODIUM 220 MG/1
81 TABLET, FILM COATED ORAL 2 TIMES DAILY
Status: DISCONTINUED | OUTPATIENT
Start: 2025-06-30 | End: 2025-07-01 | Stop reason: HOSPADM

## 2025-06-30 RX ADMIN — FENTANYL CITRATE 50 MCG: 50 INJECTION INTRAMUSCULAR; INTRAVENOUS at 15:41

## 2025-06-30 RX ADMIN — FENTANYL CITRATE 50 MCG: 50 INJECTION INTRAMUSCULAR; INTRAVENOUS at 14:54

## 2025-06-30 RX ADMIN — OXYCODONE HYDROCHLORIDE 5 MG: 5 TABLET ORAL at 17:04

## 2025-06-30 RX ADMIN — CELECOXIB 400 MG: 200 CAPSULE ORAL at 10:18

## 2025-06-30 RX ADMIN — BUPIVACAINE HYDROCHLORIDE IN DEXTROSE 2 ML: 7.5 INJECTION, SOLUTION SUBARACHNOID at 11:58

## 2025-06-30 RX ADMIN — MIDAZOLAM HYDROCHLORIDE 2 MG: 1 INJECTION, SOLUTION INTRAMUSCULAR; INTRAVENOUS at 12:01

## 2025-06-30 RX ADMIN — ACETAMINOPHEN 650 MG: 325 TABLET ORAL at 17:04

## 2025-06-30 RX ADMIN — OXYCODONE HYDROCHLORIDE 10 MG: 5 TABLET ORAL at 20:40

## 2025-06-30 RX ADMIN — MIDAZOLAM HYDROCHLORIDE 2 MG: 1 INJECTION, SOLUTION INTRAMUSCULAR; INTRAVENOUS at 11:51

## 2025-06-30 RX ADMIN — FENTANYL CITRATE 100 MCG: 50 INJECTION INTRAMUSCULAR; INTRAVENOUS at 11:51

## 2025-06-30 RX ADMIN — PREGABALIN 75 MG: 75 CAPSULE ORAL at 10:19

## 2025-06-30 RX ADMIN — DOCUSATE SODIUM AND SENNOSIDES 1 TABLET: 8.6; 5 TABLET, FILM COATED ORAL at 20:38

## 2025-06-30 RX ADMIN — FENTANYL CITRATE 50 MCG: 50 INJECTION INTRAMUSCULAR; INTRAVENOUS at 15:10

## 2025-06-30 RX ADMIN — ONDANSETRON 4 MG: 2 INJECTION INTRAMUSCULAR; INTRAVENOUS at 12:19

## 2025-06-30 RX ADMIN — TRANEXAMIC ACID 1000 MG: 10 INJECTION, SOLUTION INTRAVENOUS at 12:02

## 2025-06-30 RX ADMIN — KETOROLAC TROMETHAMINE 15 MG: 15 INJECTION, SOLUTION INTRAMUSCULAR; INTRAVENOUS at 18:45

## 2025-06-30 RX ADMIN — ACETAMINOPHEN 975 MG: 325 TABLET ORAL at 10:18

## 2025-06-30 RX ADMIN — CEFAZOLIN 2 G: 2 INJECTION, POWDER, FOR SOLUTION INTRAMUSCULAR; INTRAVENOUS at 12:02

## 2025-06-30 RX ADMIN — SODIUM CHLORIDE: 0.9 INJECTION, SOLUTION INTRAVENOUS at 11:51

## 2025-06-30 RX ADMIN — ROSUVASTATIN CALCIUM 10 MG: 10 TABLET, FILM COATED ORAL at 20:38

## 2025-06-30 RX ADMIN — ACETAMINOPHEN 650 MG: 325 TABLET ORAL at 21:56

## 2025-06-30 RX ADMIN — OXYCODONE HYDROCHLORIDE 5 MG: 5 TABLET ORAL at 16:00

## 2025-06-30 RX ADMIN — FENTANYL CITRATE 50 MCG: 50 INJECTION INTRAMUSCULAR; INTRAVENOUS at 16:03

## 2025-06-30 RX ADMIN — CEFAZOLIN 2 G: 2 INJECTION, POWDER, FOR SOLUTION INTRAMUSCULAR; INTRAVENOUS at 20:38

## 2025-06-30 RX ADMIN — DEXAMETHASONE SODIUM PHOSPHATE 4 MG: 4 INJECTION, SOLUTION INTRA-ARTICULAR; INTRALESIONAL; INTRAMUSCULAR; INTRAVENOUS; SOFT TISSUE at 12:19

## 2025-06-30 RX ADMIN — PROPOFOL 100 MCG/KG/MIN: 10 INJECTION, EMULSION INTRAVENOUS at 12:03

## 2025-06-30 RX ADMIN — ASPIRIN 81 MG: 81 TABLET, CHEWABLE ORAL at 20:38

## 2025-06-30 RX ADMIN — VANCOMYCIN HYDROCHLORIDE 1000 MG: 1 INJECTION, POWDER, LYOPHILIZED, FOR SOLUTION INTRAVENOUS at 10:31

## 2025-06-30 ASSESSMENT — COGNITIVE AND FUNCTIONAL STATUS - GENERAL
WALKING IN HOSPITAL ROOM: 2 - A LOT
MOVING TO AND FROM BED TO CHAIR: 3 - A LITTLE
WALKING IN HOSPITAL ROOM: 2 - A LOT
CLIMB 3 TO 5 STEPS WITH RAILING: 2 - A LOT
MOVING TO AND FROM BED TO CHAIR: 3 - A LITTLE
CLIMB 3 TO 5 STEPS WITH RAILING: 2 - A LOT
STANDING UP FROM CHAIR USING ARMS: 2 - A LOT
STANDING UP FROM CHAIR USING ARMS: 2 - A LOT

## 2025-06-30 NOTE — OP NOTE
Operative Report    Patient:  Maral Nieves  :  [id number]  MRN:  [id number]  Date of Procedure:  25      Pre-operative Diagnosis:  Left TKA Instability    Post-operative Diagnosis:  Left TKA Instability    Operation: Left Revision Total Knee Replacement    Surgeon:  Raven Martinez MD    Assistant(s):  LELA Molina    Anesthesia:  Spinal    Estimated Blood Loss:  25 ml    Specimens:  x3 cultures    Drains:  None    Disposition: aroused from sedation, and taken to the recovery room in a stable condition      History of Present Illness:  Ms. Nieves is a 65 y.o. year old female who has been followed in the out-patient clinic with a history of progressively worsening left knee pain secondary to a failed TKA prosthesis.  After having failed conservative, non-surgical attempts at managing the pain and limitations of functional capabilities, it was felt that the only remaining option was surgical.  A detailed conversation regarding the risks and benefits of revision knee arthroplasty surgery was had with the patient.  The risks discussed included but were not limited to: bleeding (which may or may not require transfusion), infection, damage to nerves or blood vessels, deep venous thrombosis, pulmonary embolus, prosthetic failure, loosening, prosthetic fracture, femur or tibia fracture, dislocation, leg-length inequality, persistent pain, need for future surgery, medical complications (including cardiac, respiratory and neurologic complications), anaesthetic complications, and death.  Subsequent to this conversation, all of the patient's questions were answered in great detail and informed consent was obtained for a left revision total knee arthroplasty.  She received preoperative medical clearance and was felt optimized for surgery.     Description of Procedure:  After routine preparation and draping of the patient in the operating room, a clinical time-out was held confirming the correct  patient name, MRN, , planned procedure, site, antibiotic start time and agent, and outline of any surgical concerns.  All in attendance were in agreement to proceed.  A medial parapatellar approach was made to the left knee joint.  After a thorough synovectomy, the femoral and tibial implants were removed with minimal additional bone loss.  The distal femur was shaped to accept a 6 femoral component (5 millimeter medial distal and 10 millimeter lateral distal augments) with 14x65 millimeter cemented stem and small central femoral cone.  The proximal tibia was shaped to accept a 5-minus tibial component with 12x65 millimeter cemented stem and xsmall central tibial cone with 14 millimeter VVC+ type polyethylene insert.  The patella was noted to be well fixed and was retained.  All cuts were checked using cutting and alignment guides.  The knee was felt to be well-aligned and stable throughout the range of motion after insertion of the trial and final knee components.  The bony surfaces were cleaned for cementation with pulsatile lavage.  All components were inserted using pressurized vacuum-mixed cement.  The wound was closed in layers with the skin closed using absorbable suture and a dressing was applied to the wound.    [employer] components were utilized for this procedure.    Implant Name Type Inv. Item Serial No.  Lot No. LRB No. Used Action   CEMENT BONE SIMPLEX W/TOBRAMYCIN - [id number]  CEMENT BONE SIMPLEX W/TOBRAMYCIN  [employer] [id number] Left 6 Implanted   BONE PLUG SMALL - [id number]  BONE PLUG SMALL  [employer] [id number] Left 1 Implanted   BONE PLUG MEDIUM - [id number]  BONE PLUG MEDIUM  [employer] [id number] Left 1 Implanted   DJO EMPOWR KNEETM TIBIAL AUGMENT SCREW 10MM - [id number]  DJO EMPOWR KNEETM TIBIAL AUGMENT SCREW 10MM  [employer] [id number] Left 1 Implanted   EMPOWR KNEE REVISION DISTAL FEM AUGMENT SIZE 6/7 5MM -  [id number]  EMPOWR KNEE REVISION DISTAL FEM AUGMENT SIZE 6/7 5MM  [employer] [id number] Left 1 Implanted   EMP REV KNEE SYMMETRIC CONE TIBIAL XS - [id number]  EMP REV KNEE SYMMETRIC CONE TIBIAL XS  [employer] [id number] Left 1 Implanted   EMPOWR KNEE REVISION DISTAL FEM AUGMENT SIZE 6/7 5MM - [id number]  EMPOWR KNEE REVISION DISTAL FEM AUGMENT SIZE 6/7 5MM  [employer] [id number] Left 1 Implanted   EMPOWR KNEE REVISION DISTAL FEM AUGMENT SIZE 6/7 5MM - [id number]  EMPOWR KNEE REVISION DISTAL FEM AUGMENT SIZE 6/7 5MM  [employer] [id number] Left 1 Implanted   DJO EMPOWR KNEETM CEMENTED STEM 14MM X 65MM - [id number]  DJO EMPOWR KNEETM CEMENTED STEM 14MM X 65MM  [employer] [id number] Left 1 Implanted   EMPOWR KNEE REVISION FEMUR 6 LEFT - [id number]  EMPOWR KNEE REVISION FEMUR 6 LEFT  [employer]  Left 1 Implanted   EMP REV KNEE SYMMETRIC CONE FEMORAL SM - [id number]  EMP REV KNEE SYMMETRIC CONE FEMORAL SM  [employer] [id number] Left 1 Implanted   EMPOWR VVC KNEETM TIBIAL INSERT, SIZE 2, 14MM - [id number]  EMPOWR VVC KNEETM TIBIAL INSERT, SIZE 2, 14MM  [employer] [id number] Left 1 Implanted   DJO EMPOWR KNEETM CEMENTED STEM 12MM X 65MM - [id number]  DJO EMPOWR KNEETM CEMENTED STEM 12MM X 65MM  [employer] [id number] Left 1 Implanted   DJO EMPOWR KNEETM UNIVERSAL BP NP 5L MINUS - [id number]  DJO EMPOWR KNEETM UNIVERSAL BP NP 5L MINUS  [employer] [id number] Left 1 Implanted   Threaded Pin    [employer]  Left 1 Implanted   Bone Pins    [employer]  Left 1 Implanted   tibia pins    [employer]  Left 2 Implanted   Revision VVC+, e+, Tibial Insert, Size 5 14mm Bridge up    [employer] [id number] Left 1 Implanted       I was present and scrubbed throughout all the critical components of the operation.    Raven Martinez MD     Never smoker

## 2025-06-30 NOTE — ANESTHESIA PREPROCEDURE EVALUATION
Relevant Problems   HEMATOLOGY   (+) Acute blood loss as cause of postoperative anemia      MUSCULOSKELETAL   (+) Primary osteoarthritis of left knee       Anesthesia ROS/MED HX    Anesthesia History    Previous anesthetics  Pulmonary - neg  Neuro/Psych - neg  Cardiovascular- neg  Hematological    anemia  GI/Hepatic   Difficulty swallowing  Musculoskeletal   Osteoarthritis  Renal Disease- neg  Endo/Other   Diabetes  Body Habitus: Overweight       Past Surgical History   Procedure Laterality Date    Hysterectomy      KNEE ARTHROPLASTY TOTAL Left 5/15/2024    Performed by Bobby Cannon MD at [facility]    Knee arthroscopy Right     Other surgical history      I&D pilonidal cyst    Septoplasty      Tonsillectomy      Tubal ligation         Physical Exam    Airway   Mallampati: II   TM distance: >3 FB   Neck ROM: full  Cardiovascular - normal   Rhythm: regular   Rate: normalPulmonary - normal   clear to auscultation  Dental - normal      CBC Results         05/16/24 05/07/24     0454 0916    WBC 9.39 4.12    RBC 3.56 4.63    HGB 10.7 14.0    HCT 33.0 42.3    MCV 92.7 91.4    MCH 30.1 30.2    MCHC 32.4 33.1     247          CMP Results         05/16/24 05/07/24     0454 0916     139    K 4.1 4.3    Cl 103 104    CO2 22 27    Glucose 152 77    BUN 17 17    Creatinine 0.7 0.8    Calcium 8.1 9.8    Anion Gap 8 8    EGFR >60.0 >60.0           Comment for EGFR at 0454 on 05/16/24: Calculation based on the Chronic Kidney Disease Epidemiology Collaboration (CKD-EPI) equation refit without adjustment for race.    Comment for EGFR at 0916 on 05/07/24: Calculation based on the Chronic Kidney Disease Epidemiology Collaboration (CKD-EPI) equation refit without adjustment for race.            Anesthesia Plan    Plan: spinal    Technique: spinal      Airway: natural airway / supplemental oxygen    2 ASA  Blood Products:     Use of Blood Products Discussed: Yes     Consented to blood products  Anesthetic plan and risks  discussed with: patient  Induction:    intravenous   Postop Plan:   Patient Disposition: inpatient floor planned admission   Pain Management: IV analgesics

## 2025-06-30 NOTE — ANESTHESIOLOGIST PRE-PROCEDURE ATTESTATION
Pre-Procedure Patient Identification:  I am the Primary Anesthesiologist and have identified the patient on 06/30/25 at 10:35 AM.   I have confirmed the procedure(s) will be performed by the following surgeon/proceduralist Raven Martinez MD.

## 2025-06-30 NOTE — OR SURGEON
Pre-Procedure patient identification:  I am the primary operating surgeon/proceduralist and I have reviewed the applicable pathology reports and radiology studies for this procedure. I have identified the patient and confirmed laterality is left on 06/30/25 at 10:34 AM Raven Martinez MD  Phone Number: [phone]

## 2025-06-30 NOTE — NURSING NOTE
Pt c/o 8/10 pain unrelieved by 10mg oxycodone. Dr. Fraga, ortho resident, made aware. Toradol x1 ordered and administered.

## 2025-06-30 NOTE — PLAN OF CARE
Plan of Care Review  Plan of Care Reviewed With: patient  Progress: no change  Outcome Evaluation: s/p L revision TKA. DTV @ 1470. Oxycodone PRN and tylenol ATC for pain relief.

## 2025-06-30 NOTE — CONSULTS
Hospital Medicine     Daily Progress Note       SUBJECTIVE   Pt seen and examined in the pacu post op.  Denied any chest pain or shortness of breath, no dizziness or lightheadedness, no abdominal pain, no nausea/vomiting symptoms.     OBJECTIVE   Vital signs in last 24 hours:  Temp:  [36.1 °C (97 °F)-36.6 °C (97.8 °F)] 36.1 °C (97 °F)  Heart Rate:  [62-95] 77  Resp:  [12-21] 21  BP: (105-151)/(54-85) 117/60  No intake or output data in the 24 hours ending 06/30/25 1541    Physical Exam  Constitutional:       General: She is not in acute distress.     Appearance: She is not toxic-appearing.   HENT:      Head: Normocephalic and atraumatic.   Eyes:      Conjunctiva/sclera: Conjunctivae normal.   Cardiovascular:      Rate and Rhythm: Normal rate and regular rhythm.      Heart sounds: Normal heart sounds.   Pulmonary:      Effort: Pulmonary effort is normal.      Breath sounds: Normal breath sounds.   Abdominal:      General: There is no distension.      Palpations: Abdomen is soft.      Tenderness: There is no abdominal tenderness.   Musculoskeletal:      Cervical back: Neck supple.   Skin:     General: Skin is warm and dry.   Neurological:      Mental Status: She is alert and oriented to person, place, and time.   Psychiatric:         Mood and Affect: Mood normal.         Behavior: Behavior normal.          LINES, DRAINS, AIRWAYS, WOUNDS   Peripheral IV (Adult) 06/30/25 Posterior;Right Forearm (Active)   Number of days: 0       Surgical Incision Knee Anterior;Left (Active)   Number of days:           LABS, IMAGING, TELEMETRY   Preop labs reviewed      NSR on tele in pacu     ASSESSMENT AND PLAN     Assessment and Plan     # s/p Left revision total hip arthroplasty  Post op  care, PT/OT, DVT prophylaxis and pain control per orthopedics team   encourage incentive spirometry   recommend aggressive bowel regimen to avoid constipation with narcotic use   monitor postop labs     # Gastroesophageal reflux  disease, unspecified whether esophagitis present  Cont w PPI     # Lipid disorder  Cont w statin.     # Hypertension   not on any meds but blood pressures were elevated today preop   optimize pain control   monitor       VTE Prophylaxis:  Current anticoagulants:    None      Code Status: Prior        Estimated Discharge Date: 7/1/2025   Disposition Planning: per ortho team       Ye Beebe MD  6/30/2025

## 2025-06-30 NOTE — PROGRESS NOTES
Orthopaedics Progress Note    [S]  Patient comfortable in PACU. Pain currently well controlled. Patient responds to questions appropriately and follows commands.    [O]   Vitals:    06/30/25 1545   BP: (!) 110/56   Pulse: 71   Resp: 17   Temp:    SpO2: 100%       Physical Exam:  SILT  +DP  +Quad Fires/TA/Gastroc. EHL/FHL assessment limited due to nerve block  Dressing C/d/i    [A/P]   65 y.o. female status post left revision total knee arthroplasty, POD0, doing well    -DVT prophylaxis  -Weight bearing as tolerated   -Analgesia  -Physical therapy    Olga Jain MD

## 2025-06-30 NOTE — ANESTHESIA POSTPROCEDURE EVALUATION
Patient: Maral Nieves    Procedure Summary       Date: 06/30/25 Room / Location: [facility] PAV OR 01 / [facility] OR PAV    Anesthesia Start: 1151 Anesthesia Stop: 1430    Procedure: Revision Left Total Knee Arthroplasty (Left: Knee) Diagnosis:       Instability of internal left knee prosthesis, initial encounter (CMS/[facility])      (Instability)    Surgeons: Raven Martinez MD Responsible Provider: Matteo Martino MD    Anesthesia Type: spinal ASA Status: 2            Anesthesia Type: spinal  PACU Vitals  6/30/2025 1422 - 6/30/2025 1430        6/30/2025  1428             BP: 107/54    Temp: 36.1 °C (97 °F)    Pulse: 95    Resp: 17    SpO2: 96 %              Anesthesia Post Evaluation    Pain management: adequate  Patient location during evaluation: PACU  Patient participation: complete - patient participated  Level of consciousness: awake and alert  Cardiovascular status: acceptable  Airway Patency: adequate  Respiratory status: acceptable  Hydration status: acceptable  Anesthetic complications: no

## 2025-06-30 NOTE — ANESTHESIA PROCEDURE NOTES
Spinal Block    Patient location during procedure: pre-op  Start time: 6/30/2025 11:51 AM  End time: 6/30/2025 11:58 AM  Staffing  Performed: anesthesiologist   Anesthesiologist: Matteo Martino MD  Performed by: Matteo Martino MD  Authorized by: Matteo Martino MD    Reason for block: primary anesthetic  Preanesthetic Checklist  Completed: patient identified, site marked, surgical consent, pre-op evaluation, timeout performed, IV checked, risks and benefits discussed, monitors and equipment checked and sterile field maintained during procedure  Spinal Block  Patient position: sitting  Prep: Betadine  Patient monitoring: heart rate, continuous pulse ox and blood pressure  Approach: right paramedian  Location: L3-4  Needle  Needle type: Quincke   Needle gauge: 22 G  Needle length: 3.5 in  Assessment  Sensory level: T10  Events: cerebrospinal fluid  Additional Notes  BUPIV W EPI WASH  Medications Administered -   bupivacaine PF (MARCAINE SPINAL) 0.75% intrathecal injection - intrathecal   2 mL - 6/30/2025 11:58:00 AM

## 2025-07-01 VITALS
HEART RATE: 63 BPM | SYSTOLIC BLOOD PRESSURE: 105 MMHG | TEMPERATURE: 97.3 F | RESPIRATION RATE: 16 BRPM | BODY MASS INDEX: 27.32 KG/M2 | HEIGHT: 65 IN | WEIGHT: 164 LBS | OXYGEN SATURATION: 96 % | DIASTOLIC BLOOD PRESSURE: 55 MMHG

## 2025-07-01 PROBLEM — E87.1 HYPONATREMIA: Status: ACTIVE | Noted: 2025-07-01

## 2025-07-01 LAB
ANION GAP SERPL CALC-SCNC: 4 MEQ/L (ref 3–15)
BUN SERPL-MCNC: 14 MG/DL (ref 7–25)
CALCIUM SERPL-MCNC: 8.2 MG/DL (ref 8.6–10.3)
CHLORIDE SERPL-SCNC: 101 MEQ/L (ref 98–107)
CO2 SERPL-SCNC: 26 MEQ/L (ref 21–31)
CREAT SERPL-MCNC: 0.7 MG/DL (ref 0.6–1.2)
EGFRCR SERPLBLD CKD-EPI 2021: >60 ML/MIN/1.73M*2
ERYTHROCYTE [DISTWIDTH] IN BLOOD BY AUTOMATED COUNT: 11.7 % (ref 11.7–14.4)
GLUCOSE SERPL-MCNC: 149 MG/DL (ref 70–99)
HCT VFR BLD AUTO: 30.3 % (ref 35–45)
HGB BLD-MCNC: 10.4 G/DL (ref 11.8–15.7)
MCH RBC QN AUTO: 31.7 PG (ref 28–33.2)
MCHC RBC AUTO-ENTMCNC: 34.3 G/DL (ref 32.2–35.5)
MCV RBC AUTO: 92.4 FL (ref 83–98)
PLATELET # BLD AUTO: 148 K/UL (ref 150–369)
PMV BLD AUTO: 11 FL (ref 9.4–12.3)
POTASSIUM SERPL-SCNC: 4.1 MEQ/L (ref 3.5–5.1)
RBC # BLD AUTO: 3.28 M/UL (ref 3.93–5.22)
SODIUM SERPL-SCNC: 131 MEQ/L (ref 136–145)
WBC # BLD AUTO: 8.98 K/UL (ref 3.8–10.5)

## 2025-07-01 PROCEDURE — 97166 OT EVAL MOD COMPLEX 45 MIN: CPT | Mod: GO

## 2025-07-01 PROCEDURE — 63700000 HC SELF-ADMINISTRABLE DRUG: Performed by: NURSE PRACTITIONER

## 2025-07-01 PROCEDURE — 80048 BASIC METABOLIC PNL TOTAL CA: CPT | Performed by: NURSE PRACTITIONER

## 2025-07-01 PROCEDURE — 97162 PT EVAL MOD COMPLEX 30 MIN: CPT | Mod: GP

## 2025-07-01 PROCEDURE — 97116 GAIT TRAINING THERAPY: CPT | Mod: GP

## 2025-07-01 PROCEDURE — 97535 SELF CARE MNGMENT TRAINING: CPT | Mod: GO

## 2025-07-01 PROCEDURE — 85027 COMPLETE CBC AUTOMATED: CPT | Performed by: NURSE PRACTITIONER

## 2025-07-01 PROCEDURE — 36415 COLL VENOUS BLD VENIPUNCTURE: CPT | Performed by: NURSE PRACTITIONER

## 2025-07-01 PROCEDURE — 99232 SBSQ HOSP IP/OBS MODERATE 35: CPT | Performed by: HOSPITALIST

## 2025-07-01 PROCEDURE — 63600000 HC DRUGS/DETAIL CODE: Mod: JZ | Performed by: NURSE PRACTITIONER

## 2025-07-01 PROCEDURE — 25800000 HC PHARMACY IV SOLUTIONS: Performed by: NURSE PRACTITIONER

## 2025-07-01 RX ORDER — AMOXICILLIN 250 MG
1 CAPSULE ORAL 2 TIMES DAILY
Start: 2025-07-01

## 2025-07-01 RX ORDER — NAPROXEN SODIUM 220 MG/1
81 TABLET, FILM COATED ORAL 2 TIMES DAILY
Start: 2025-07-01 | End: 2025-07-29

## 2025-07-01 RX ORDER — ONDANSETRON 4 MG/1
4 TABLET, FILM COATED ORAL EVERY 8 HOURS PRN
Qty: 10 TABLET | Refills: 0 | Status: SHIPPED | OUTPATIENT
Start: 2025-07-01

## 2025-07-01 RX ORDER — POLYETHYLENE GLYCOL 3350 17 G/17G
17 POWDER, FOR SOLUTION ORAL DAILY PRN
Start: 2025-07-01

## 2025-07-01 RX ORDER — ZOLPIDEM TARTRATE 10 MG/1
10 TABLET ORAL NIGHTLY
Start: 2025-07-01

## 2025-07-01 RX ORDER — OXYCODONE HYDROCHLORIDE 5 MG/1
5-10 TABLET ORAL EVERY 4 HOURS PRN
Qty: 30 TABLET | Refills: 0 | Status: SHIPPED | OUTPATIENT
Start: 2025-07-01 | End: 2025-07-06

## 2025-07-01 RX ORDER — ACETAMINOPHEN 325 MG/1
650 TABLET ORAL EVERY 6 HOURS PRN
Start: 2025-07-01

## 2025-07-01 RX ADMIN — ASPIRIN 81 MG: 81 TABLET, CHEWABLE ORAL at 10:15

## 2025-07-01 RX ADMIN — ACETAMINOPHEN 650 MG: 325 TABLET ORAL at 04:09

## 2025-07-01 RX ADMIN — OXYCODONE HYDROCHLORIDE 10 MG: 5 TABLET ORAL at 04:13

## 2025-07-01 RX ADMIN — SODIUM CHLORIDE: 9 INJECTION, SOLUTION INTRAVENOUS at 02:51

## 2025-07-01 RX ADMIN — ACETAMINOPHEN 650 MG: 325 TABLET ORAL at 10:15

## 2025-07-01 RX ADMIN — CEFAZOLIN 2 G: 2 INJECTION, POWDER, FOR SOLUTION INTRAMUSCULAR; INTRAVENOUS at 04:10

## 2025-07-01 RX ADMIN — DEXAMETHASONE SODIUM PHOSPHATE 8 MG: 4 INJECTION, SOLUTION INTRA-ARTICULAR; INTRALESIONAL; INTRAMUSCULAR; INTRAVENOUS; SOFT TISSUE at 05:54

## 2025-07-01 RX ADMIN — ONDANSETRON 4 MG: 2 INJECTION INTRAMUSCULAR; INTRAVENOUS at 08:44

## 2025-07-01 RX ADMIN — OXYCODONE HYDROCHLORIDE 5 MG: 5 TABLET ORAL at 12:26

## 2025-07-01 ASSESSMENT — COGNITIVE AND FUNCTIONAL STATUS - GENERAL
DRESSING REGULAR LOWER BODY CLOTHING: 4 - NONE
EATING MEALS: 4 - NONE
MOVING TO AND FROM BED TO CHAIR: 3 - A LITTLE
HELP NEEDED FOR PERSONAL GROOMING: 4 - NONE
MOVING TO AND FROM BED TO CHAIR: 3 - A LITTLE
WALKING IN HOSPITAL ROOM: 4 - NONE
DRESSING REGULAR UPPER BODY CLOTHING: 4 - NONE
CLIMB 3 TO 5 STEPS WITH RAILING: 3 - A LITTLE
AFFECT: WFL
WALKING IN HOSPITAL ROOM: 3 - A LITTLE
TOILETING: 4 - NONE
HELP NEEDED FOR BATHING: 4 - NONE
AFFECT: WFL
CLIMB 3 TO 5 STEPS WITH RAILING: 3 - A LITTLE
STANDING UP FROM CHAIR USING ARMS: 4 - NONE
STANDING UP FROM CHAIR USING ARMS: 3 - A LITTLE

## 2025-07-01 NOTE — PROGRESS NOTES
Orthopaedics Progress Note    [S]  NAEON. Patient comfortable in bed. Pain currently well controlled. Patient responds to questions appropriately and follows commands. Knee cultures pending. PT to see today.    [O]   Vitals:    07/01/25 1151   BP: (!) 105/55   Pulse: 63   Resp: 16   Temp: 36.3 °C (97.3 °F)   SpO2: 96%       Physical Exam:  SILT  +DP  +Quad Fires/TA/Gastroc. EHL/FHL assessment limited due to nerve block  Dressing C/d/i    [A/P]   65 y.o. female status post left revision total knee arthroplasty, POD1, doing well    -DVT prophylaxis  -Weight bearing as tolerated   -Analgesia  -Physical therapy    Olga Jain MD

## 2025-07-01 NOTE — PROGRESS NOTES
Physical Therapy -  Daily Treatment/Progress Note     Patient: Maral Nieves  Location: [facility] 5PAV 5424  MRN: [id number]  Today's date: 7/1/2025    HISTORY OF PRESENT ILLNESS     Maral is a 65 y.o. female admitted on 6/30/2025 with Instability of internal left knee prosthesis, initial encounter (CMS/[facility]) [T84.023A]  S/P revision of total knee, left [Z96.652]. Principal problem is No Principal Problem: There is no principal problem currently on the Problem List. Please update the Problem List and refresh..    Past Medical History  Maral has a past medical history of Family history of bleeding or clotting disorder, Herniated disc, cervical, Lipid disorder, Lumbar herniated disc, OA (osteoarthritis), and Seasonal allergies.    History of Present Illness   Pt is a 65 y.o. female status post left revision total knee arthroplasty on 6/30/25 ([facility]).    PRIOR LEVEL OF FUNCTION AND LIVING ENVIRONMENT     Prior Level of Function      Flowsheet Row Most Recent Value   Dominant Hand right   Ambulation independent   Transferring independent   Toileting independent   Bathing independent   Dressing independent   Eating independent   IADLs independent   Driving/Transportation    Communication understands/communicates without difficulty   Prior Level of Function Comment ADL and IADL independence, no AD for ambulation, , retired        Prior Living Environment      Flowsheet Row Most Recent Value   People in Home spouse   Current Living Arrangements home   Living Environment Comment Lives with  in 2SH, 2 VY from front, full bathroom with stall shower on 1st fl. FF to 2fl bed and bathroom (tub)       VITALS AND PAIN     PT Vitals      Date/Time Pulse SpO2 BP BP Location BP Method Pt Position [facility]   07/01/25 0811 67 97 % 125/60 Left upper arm Automatic Sitting GJG          PT Pain      Date/Time Pain Type Side/Orientation Location Rating: Rest Rating: Activity  Interventions [facility]   07/01/25 0811 Pain Assessment left knee 7 7 cold applied GJG               Objective     OBJECTIVE     Start time:  0808  End time:  0831  Session Length: 23 min  Mode of Treatment: co-treatment (Ric NOLASCO OT)  Reason for co-treatment: +n/v in gym but pt wanting d/c today    General Observations  Patient received in chair, leg(s) elevated, in therapy gym. She was agreeable to therapy, no issues or concerns identified by nurse prior to session. +n/v during session in gym but pt feeling better after; VSS    Precautions: fall, weight bearing  Extremity Weight-Bearing Status: left lower extremity  Left LE Weight-Bearing Status: weight-bearing as tolerated (WBAT)           PT Eval and Treat - 07/01/25 0808          Cognition    Orientation Status oriented x 4     Affect/Mental Status WFL     Follows Commands WFL     Cognitive Function WFL     Comment, Cognition pleasant and cooperative        Vision Assessment/Intervention    Vision Assessment corrective lenses for reading        Hearing Assessment    Hearing Status WFL        Sensory Assessment    Sensory Assessment sensation intact, lower extremities        Edema Symptoms/Interventions    Location lower extremity, left     Description localized     Associated Symptoms pain     Treatment Interventions cryotherapy;active range of motion        Lower Extremity Assessment    LE Assessment ROM and Strength WFL except        Lower Extremity Range of Motion    Knee, Left (ROM) ~5-70        Lower Extremity Strength    Hip, Left (Strength) 3+/5     Knee, Left (Strength) 3-/5     Ankle, Left (Strength) 4/5        Mobility Belt    Mobility Belt Used During Session no - independent with all mobility        Sit/Stand Transfer    Surface chair with arm rests     [place] modified independence     Safety/Cues increased time to complete     Assistive Device walker, front-wheeled        Car Transfer    Transfer Technique sit-stand;stand-sit     [place]  supervision     Safety/Cues increased time to complete;verbal cues;visual cues;technique     Assistive Device walker, 4-wheeled     Comment --        Gait Training    [place], Gait supervision     Safety/Cues increased time to complete;verbal cues     Assistive Device walker, front-wheeled     Distance in Feet 80 feet     Pattern step-to;step-through     Deviations/Abnormal Patterns renata decreased;step length decreased     Comment supv provided 2/2 pt's n/v in gym        Stairs Training    [place], Stairs supervision     Safety/Cues increased time to complete     Handrail Location (Stairs) right side (ascending);left side (descending)     Number of Stairs 4     Stair Height 6 inches     Ascending Stairs Technique step-to-step     Descending Stairs Technique step-to-step        Balance    Static Sitting Balance WFL;sitting in chair     Dynamic Sitting Balance WFL;sitting in chair     Sit to Stand Dynamic Balance WFL     Static Standing Balance WFL;supported     Dynamic Standing Balance WFL;supported        Lower Extremity (Therapeutic Exercise)    Exercise Position/Type seated;AROM (active range of motion);static isometric contraction     General Exercise bilateral;ankle pumps;heel slides;gluteal sets;LAQ (long arc quad);quad sets     Reps and Sets only 1-2 reps of each 2/2 pt's n/v                      10 Meter Walk Test (Self-Selected Velocity)  Trial One: Ten Meter Walk Test (sec): 32 seconds  Trial Two: Ten Meter Walk Test (sec): 27 seconds  Assistive Device: walker, front-wheeled  Trial One: Gait Speed (m/s): 0.19 m/s  Trial Two: Gait Speed (m/s): 0.22 m/s  Average Gait Speed (m/s): Two Trials: 0.21 m/s                       Education Documentation  Treatment Plan, taught by Taylor Galvin PT at 7/1/2025  8:35 AM.  Learner: Patient  Readiness: Acceptance  Method: Demonstration  Response: Demonstrated Understanding  Comment: PT POC, role of PT, mobility recommendations, d/c planning, TKA ther  "ex        Session Outcome  Patient in therapy gym, in chair (with OT) at end of session, all needs met. Nursing notified about patient's performance and patient's response to therapy/activity.    AM-PAC™ - Mobility (Current Function)     Turning form your back to your side while in flat bed without using bedrails 3 - A Little   Moving from lying on your back to sitting on the side of a flat bed without using bedrails 3 - A Little   Moving to and from a bed to a chair 3 - A Little   Standing up from a chair using your arms 3 - A Little   To walk in a hospital room 3 - A Little   Climbing 3-5 steps with a railing 3 - A Little   AM-PAC™ Mobility Score 18          ASSESSMENT AND PLAN     Progress Summary  64yo female s/p rev left tka POD1. Pt presents at an indepenent level for transfers, supv for amb with rw and stair climbing. Pt nauseous during sesion and with +vomiting; therefore seated ther ex perfomed with limited reps. Pt reported feeling better by end of session. She is cleared for d/c home with spouse's assist. Rec outpt PT to maximize knee rom and strength when cleared by MD.    Patient/Family Therapy Goals Statement: \"to go home today\"    PT Plan      Flowsheet Row Most Recent Value   Therapy Frequency evaluation only at 07/01/2025 0808       PT Discharge Recommendations      Flowsheet Row Most Recent Value   PT Recommended Discharge Disposition home with assistance, home with outpatient services at 07/01/2025 0808   Anticipated Equipment Needs if Discharged Home (PT) none at 07/01/2025 0808              "

## 2025-07-01 NOTE — PROGRESS NOTES
Occupational Therapy -  Initial Evaluation     Patient: Maral Nieves  Location: [facility] 5PAV 5424  MRN: [id number]  Today's date: 7/1/2025    HISTORY OF PRESENT ILLNESS     Maral is a 65 y.o. female admitted on 6/30/2025 with Instability of internal left knee prosthesis, initial encounter (CMS/[facility]) [T84.023A]  S/P revision of total knee, left [Z96.652]. Principal problem is No Principal Problem: There is no principal problem currently on the Problem List. Please update the Problem List and refresh..    Past Medical History  Maral has a past medical history of Family history of bleeding or clotting disorder, Herniated disc, cervical, Lipid disorder, Lumbar herniated disc, OA (osteoarthritis), and Seasonal allergies.    History of Present Illness   Pt is a 65 y.o. female status post left revision total knee arthroplasty on 6/30/25 ([facility]).    PRIOR LEVEL OF FUNCTION AND LIVING ENVIRONMENT     Prior Level of Function      Flowsheet Row Most Recent Value   Dominant Hand right   Ambulation independent   Transferring independent   Toileting independent   Bathing independent   Dressing independent   Eating independent   IADLs independent   Driving/Transportation    Communication understands/communicates without difficulty   Prior Level of Function Comment ADL and IADL independence, no AD for ambulation, , retired        Prior Living Environment      Flowsheet Row Most Recent Value   People in Home spouse   Current Living Arrangements home   Living Environment Comment Lives with  in 2SH, 2 VY from front, full bathroom with stall shower on 1st fl. 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 [employer]   Occupational History/Life Experiences likes to exercise at [employer] in [place]   Environmental Supports and  Barriers lives with supportive spouse       VITALS AND PAIN     OT Vitals      Date/Time Pulse SpO2 BP BP Location BP Method Pt Position Observations [facility]   07/01/25 0811 67 97 % 125/60 Left upper arm Automatic Sitting -- GJG   07/01/25 0837 61 -- 120/63 Left upper arm Automatic Sitting post session, post n/v AD          OT Pain      Date/Time Pain Type Side/Orientation Location Rating: Rest Rating: Activity Interventions [facility]   07/01/25 0811 Pain Assessment knee left 7 7 cold applied GJG               Objective     OBJECTIVE     Start time:  0811  End time:  0837  Session Length: 26 min  Mode of Treatment: co-treatment  Reason for co-treatment: in gym, nauseaus and low activity tolerance, patient wanting to d/c today    General Observations  Patient received in therapy gym. She was agreeable to therapy, no issues or concerns identified by nurse prior to session.      Precautions: fall, weight bearing (LLE WBAT)              OT Eval and Treat - 07/01/25 0811          Cognition    Orientation Status oriented x 4     Affect/Mental Status WFL     Follows Commands WFL     Cognitive Function WFL        Vision Assessment/Intervention    Vision Assessment WFL        Hearing Assessment    Hearing Status WFL        Sensory Assessment    Sensory Assessment sensation intact, upper extremities        Upper Extremity Assessment    UE Assessment ROM and Strength WFL        Bed Mobility    Comment not assessed due to nausea and vomiting, however, patient verbalized an appropriate technique for performance of bed mobility and reports her spouse can assist at needed at home        Mobility Belt    Mobility Belt Used During Session no - independent with all mobility        Sit/Stand Transfer    Surface chair with arm rests     [place] modified independence     Assistive Device walker, front-wheeled        Toilet Transfer    Transfer Technique sit/stand     [place] modified independence     Assistive Device walker,  front-wheeled     Comment completed simulated home setup toilet transfer at mod I. on toilet, had to vomit, vomited into emesis bag and reported that her nausea resolved.        Shower/Tub Transfer    Transfer Type Shower     Transfer Technique step over, right entry;step over, left entry     Comment not assessed due to nausea, however patient verbalized an appropriate technique to perform a stall shower transfer, and reports her spouse can assist, no anticipated issues        Functional Mobility    Distance in therapy gym     Functional Mobility [place] modified independence     Assistive Device walker, front-wheeled        Bathing    Comment provided patient with education on adaptive ADL strategies for bathing s/p TKA, pt verbalized understanding        Lower Body Dressing    Comment patient was provided with education on adaptive LB ADLS strategies s/p TKA like dressing operative LE first, sitting in a comfortable and stable position. pt verbalized understanding. pt reports her spouse can assist, and reports that she feels confident after having surgery last year that she can perform these tasks        Toileting    Comment pt reports independence        Balance    Static Sitting Balance WFL     Dynamic Sitting Balance WFL     Sit to Stand Dynamic Balance WFL     Static Standing Balance WFL     Dynamic Standing Balance WFL                     Functional Reach Test  Trial One: Functional Reach Test (in): 0 inches (not tested due to vomiting)  Trial Two: Functional Reach Test (in): 0 inches  Average (in): 0 inches                        Education Documentation  Safety, taught by Ric Mcnally OT at 7/1/2025  8:48 AM.  Learner: Patient  Readiness: Acceptance  Method: Explanation  Response: Verbalizes Understanding  Comment: OT POC, safety with ADLs, fall prevention, RW use, adaptive ADL strategie,s techniques for transers to toilet, bed and shower    Rehabilitation Therapy, taught by Uli  Ric Sandoval OT at 7/1/2025  8:48 AM.  Learner: Patient  Readiness: Acceptance  Method: Explanation  Response: Verbalizes Understanding  Comment: OT POC, safety with ADLs, fall prevention, RW use, adaptive ADL strategie,s techniques for transers to toilet, bed and shower    Activity, taught by Ric Mcnally OT at 7/1/2025  8:48 AM.  Learner: Patient  Readiness: Acceptance  Method: Explanation  Response: Verbalizes Understanding  Comment: OT POC, safety with ADLs, fall prevention, RW use, adaptive ADL strategie,s techniques for transers to toilet, bed and shower        Session Outcome  Patient in therapy gym at end of session, returned to room by therapy aide. Nursing notified about patient's performance, patient's response to therapy/activity, change in vital signs, and patient's position.    AM-PAC™ - ADL (Current Function)     Putting on/taking off regular lower body clothing 4 - None   Bathing 4 - None   Toileting 4 - None   Putting on/taking off regular upper body clothing 4 - None   Help for taking care of personal grooming 4 - None   Eating meals 4 - None   AM-PAC™ ADL Score 24          ASSESSMENT AND PLAN     Progress Summary  OT Robb kulkarni, Lucita has a L TKA yesterday. today she was nauseaus and vomited in the therapy gym, however that did not stop her from completing the relevant functional tasks to be cleared for home with family assist. despite her nausea and vomitng, her BP was stable with activity, and she completed relevant functional tasks at mod I with RW, and verbalized appropriate techniques for ADLs and shower tranfer. RN notified. rec home with assist for ADLs. d/c OT at this time    Patient/Family Therapy Goal Statement: to get back to the gym    Reason for Discharge: no further needs identified, patient met all goals and outcomes    OT Discharge Recommendations      Flowsheet Row Most Recent Value   OT Recommended Discharge Disposition home with assistance at 07/01/2025 8485    Anticipated Equipment Needs if Discharged Home (OT) none at 07/01/2025 7440

## 2025-07-01 NOTE — HOSPITAL COURSE
Maral is a 65 y.o. female admitted on 6/30/2025 with Instability of internal left knee prosthesis, initial encounter (CMS/[facility]) [T84.023A]  S/P revision of total knee, left [Z96.652]. Principal problem is No Principal Problem: There is no principal problem currently on the Problem List. Please update the Problem List and refresh..    Past Medical History  Maral has a past medical history of Family history of bleeding or clotting disorder, Herniated disc, cervical, Lipid disorder, Lumbar herniated disc, OA (osteoarthritis), and Seasonal allergies.    History of Present Illness   Pt is a 65 y.o. female status post left revision total knee arthroplasty on 6/30/25 ([facility]).

## 2025-07-01 NOTE — PLAN OF CARE
Plan of Care Review  Plan of Care Reviewed With: patient  Progress: improving  Outcome Evaluation: OOB x1 assist with walker, ambulated in halls with RN. Cleared by PT/OT. Voiding without difficulty. Nausea resolved. Tylenol ATC and oxycodone PRN for pain relief. Plan for d/c home.

## 2025-07-01 NOTE — PROGRESS NOTES
Hospital Medicine     Daily Progress Note       SUBJECTIVE     Patient resting in the recliner, he is getting ready to work w/ PT/OT.   She endorses significant pain, increases w/ movement.   She reports mild nausea. She denies HA, dizziness, CP, dyspnea, abdominal pain, vomiting.      OBJECTIVE   Vital signs in last 24 hours:  Temp:  [35.8 °C (96.5 °F)-36.5 °C (97.7 °F)] 36.1 °C (97 °F)  Heart Rate:  [52-95] 61  Resp:  [12-21] 18  BP: (103-126)/(54-70) 120/63    Intake/Output Summary (Last 24 hours) at 7/1/2025 1014  Last data filed at 7/1/2025 0330  Gross per 24 hour   Intake --   Output 700 ml   Net -700 ml       Physical Exam  Vitals and nursing note reviewed.   Constitutional:       Appearance: Normal appearance.   HENT:      Head: Normocephalic and atraumatic.   Eyes:      Conjunctiva/sclera: Conjunctivae normal.   Cardiovascular:      Rate and Rhythm: Normal rate and regular rhythm.      Heart sounds: Normal heart sounds.   Pulmonary:      Effort: Pulmonary effort is normal. No respiratory distress.      Breath sounds: Normal breath sounds.   Abdominal:      General: Bowel sounds are normal. There is no distension.      Palpations: Abdomen is soft.      Tenderness: There is no abdominal tenderness.   Musculoskeletal:      Cervical back: Neck supple.      Comments: L Knee: dressing C/D/I, neurovascularly intact distally   Skin:     General: Skin is warm and dry.   Neurological:      General: No focal deficit present.      Mental Status: She is oriented to person, place, and time.   Psychiatric:         Mood and Affect: Mood normal.        LINES, DRAINS, AIRWAYS, WOUNDS   Peripheral IV (Adult) 06/30/25 Posterior;Right Forearm (Active)   Number of days: 1       Surgical Incision Knee Anterior;Left (Active)   Number of days:         LABS, IMAGING, TELEMETRY   Labs:  Results from last 7 days   Lab Units 07/01/25  0625   SODIUM mEQ/L 131*   POTASSIUM mEQ/L 4.1   CHLORIDE mEQ/L 101   CO2 mEQ/L 26   BUN  mg/dL 14   CREATININE mg/dL 0.7   CALCIUM mg/dL 8.2*   GLUCOSE mg/dL 149*     Results from last 7 days   Lab Units 07/01/25  0625   WBC K/uL 8.98   HEMOGLOBIN g/dL 10.4*   HEMATOCRIT % 30.3*   PLATELETS K/uL 148*     Microbiology Results       Procedure Component Value Units Date/Time    Anaerobic Culture / Smear (includes Aerobic Culture) Knee, Left [[id number]] Collected: 06/30/25 1259    Specimen: Tissue from Knee, Left Updated: 06/30/25 2011     Gram Stain Result 1+ WBC      No organisms seen    Anaerobic Culture / Smear (includes Aerobic Culture) Knee, Left [[id number]] Collected: 06/30/25 1239    Specimen: Tissue from Knee, Left Updated: 06/30/25 2131     Gram Stain Result 1+ WBC Seen      No organisms seen    Anaerobic Culture / Smear (includes Aerobic Culture) Knee, Left [[id number]] Collected: 06/30/25 1224    Specimen: Tissue from Knee, Left Updated: 06/30/25 2012     Gram Stain Result 1+ WBC      No organisms seen          Above labs have been personally reviewed.     Radiology:  Radiology Reports in last 24hX-RAY KNEE LEFT 1 OR 2 VIEWS  Result Date: 6/30/2025  CLINICAL HISTORY  Post operative PRIOR STUDY: None relevant TECHNIQUE: AP and LATERAL image of the  left knee. COMMENT: The femoral and tibial components appear in good position.  There is no fracture or dislocation.  There is air and fluid noted within the soft tissues.     IMPRESSION: Postoperative change.      Telemetry: n/a     ASSESSMENT AND PLAN     Assessment and Plan     # S/p Revision LTKA on 6/30/25  - management as per Ortho  - pain control, DVT ppx, WBS, PT/OT as per Ortho  - encourage IS  - recommend bowel regimen     # Gastroesophageal reflux disease, unspecified whether esophagitis present  - continue PPI     # Lipid disorder  - continue statin     # Hypertension  - not on any medications as outpt but blood pressures was elevated on PAT eval  - pain control  - monitor VS     # Hyponatremia  - Sodium: 131.0 mmol/L (7/1/25)  - Na 133 on  labs from 5/2024  - encourage adequate PO intake  - pain control; avoid NSAIDs  - monitor BMP     # Anemia: chronic, normocytic  - hemoglobin: 10.4 (7/1/25)  - est. baseline hemoglobin: 11.3 g/dL  - DVT ppx as per Ortho  - outpatient follow-up for routine monitoring       VTE Prophylaxis:  Current anticoagulants:    None      Code Status: Full Code        Estimated Discharge Date: 7/1/2025     Disposition Planning: as per Ortho       DERIC Chilel  7/1/2025

## 2025-07-01 NOTE — PLAN OF CARE
Problem: Knee Arthroplasty  Goal: Optimal Functional Ability  Intervention: Promote Optimal Functional Status  Flowsheets (Taken 7/1/2025 8033)  Activity Management: ambulated in acosta     Problem: Adult Inpatient Plan of Care  Goal: Plan of Care Review  Flowsheets (Taken 7/1/2025 9234)  Progress: no change  Outcome Evaluation: PT eval complete. Pt independent with mobility using walker. no further inpt PT needs. PT to sign off. Rec home with spouse.  Plan of Care Reviewed With: patient

## 2025-07-01 NOTE — PLAN OF CARE
Problem: Adult Inpatient Plan of Care  Goal: Plan of Care Review  Outcome: Progressing  Flowsheets (Taken 7/1/2025 9375)  Progress: improving  Outcome Evaluation: OT Robb kulkarni, Lucita has a L TKA yesterday. today she was nauseaus and vomited in the therapy gym, however that did not stop her from completing the relevant functional tasks to be cleared for home with family assist. despite her nausea and vomitng, her BP was stable with activity, and she completed relevant functional tasks at mod I with RW, and verbalized appropriate techniques for ADLs and shower tranfer. RN notified. rec home with assist for ADLs. d/c OT at this time  Plan of Care Reviewed With: patient

## 2025-07-01 NOTE — PLAN OF CARE
Plan of Care Review  Plan of Care Reviewed With: patient  Progress: no change  Outcome Evaluation: Pt  AAOX4. LLE optifoam C/D/I. Patient oob assist x1 with RW. Oxycodone 10mg for pain prn and tylenol atc.

## 2025-07-05 LAB
GRAM STN SPEC: NORMAL
MICROORGANISM SPEC CULT: NORMAL

## 2025-08-29 ENCOUNTER — HOSPITAL ENCOUNTER (OUTPATIENT)
Dept: RADIOLOGY | Facility: CLINIC | Age: 65
Discharge: HOME | End: 2025-08-29
Attending: GENERAL ACUTE CARE HOSPITAL
Payer: COMMERCIAL

## 2025-08-29 DIAGNOSIS — Z12.31 OTHER SCREENING MAMMOGRAM: ICD-10-CM

## 2025-08-29 PROCEDURE — 77063 BREAST TOMOSYNTHESIS BI: CPT

## (undated) DEVICE — SUCTION 18FR FRAZIER DISPOSABLE

## (undated) DEVICE — MANIFOLD FOUR PORT NEPTUNE

## (undated) DEVICE — SLEEVE SCD COMFORT KNEE LENGTH MED

## (undated) DEVICE — SET HANDPIECE INTERPULSE

## (undated) DEVICE — STERISTRIP 1/2INX4IN

## (undated) DEVICE — BLADE 9 X .51 X 31MM PERCISION THIN

## (undated) DEVICE — BLADE SAGITTAL #127 STABLECUT

## (undated) DEVICE — PREVENA PLUS 125 PUMP 14 DAY 150ML NPO ONLY

## (undated) DEVICE — SUTURE STRATAFIX PDO 1 CTX TAPER 36CM

## (undated) DEVICE — DRAPE 3/4 REINFORCED 53X77 20/CS

## (undated) DEVICE — CUFF TOURNIQUET DISP 34 X 4

## (undated) DEVICE — DRAPE IOBAN

## (undated) DEVICE — PACK TOTAL JOINT

## (undated) DEVICE — COVER LIGHTHANDLE STERILE BLUE

## (undated) DEVICE — DRAPE 3/4 REINFORCED

## (undated) DEVICE — STERILE FLUTED HEADLESS 1/8" PIN 3.5" LONG

## (undated) DEVICE — VEST STERILE

## (undated) DEVICE — HOOD T7 PLUS

## (undated) DEVICE — SYRINGE DISP LUER-LOK 30 CC

## (undated) DEVICE — PAD GROUND ELECTROSURGICAL W/CORD

## (undated) DEVICE — TUBING SMOKE EVAC PENCIL COATED

## (undated) DEVICE — GLOVE SZ 8.5 LINER PROTEXIS PI BL

## (undated) DEVICE — SALINE .9% 3 LITER BAG

## (undated) DEVICE — GLOVE SZ 8 PROTEXIS PI

## (undated) DEVICE — DRAPE U ORTHOMAX 6/CS

## (undated) DEVICE — MIX-EVAC HIGH VACUUM KIT

## (undated) DEVICE — DRAPE-U NON-STERILE

## (undated) DEVICE — APPLICATOR CHLORAPREP 26ML ORANGE TINT

## (undated) DEVICE — BLANKET UPPER BODY BAIR HUGGER

## (undated) DEVICE — BANDAGE ESMARK 6X12 LATEX FREE

## (undated) DEVICE — SUTURE POLYSORB 2-0 UNDYED 1X30 P-17

## (undated) DEVICE — GLOVE SZ 8 LINER PROTEXIS PI BL

## (undated) DEVICE — SUTURE STRATAFIX 3-0 60CM MONOCRYL LUS UNDYED

## (undated) DEVICE — ADHESIVE SKIN DERMABOND ADVANCED 0.7ML

## (undated) DEVICE — DRAPE TOWEL 17X23 NON-STERILE

## (undated) DEVICE — DRESSING OPTIFOAM 4 X 10IN POST-OP

## (undated) DEVICE — SOLN IRRIG STER WATER 1000ML

## (undated) DEVICE — TRAP MUCUS SPECIMEN 40CC

## (undated) DEVICE — NEEDLE DISP HYPO 21G X1 1/2 IN

## (undated) DEVICE — CULTURETTE

## (undated) DEVICE — GOWN SIRUS NONRNF RAGLAN 2XL ST 28/CS

## (undated) DEVICE — DRAPE STERI TOWEL PLASTIC 17X23

## (undated) DEVICE — GLOVE SZ 7.5 PROTEXIS PI

## (undated) DEVICE — COVER LIGHTHANDLE (STERILE SINGLE PA

## (undated) DEVICE — GLOVE SZ 8.5 PROTEXIS CLASSIC LATEX

## (undated) DEVICE — SPONGE LAP 18X18 SAFE-T RFID ENHANCED XRAY

## (undated) DEVICE — *T* 3.2MM X 18.3MM METAL CUT HELIOCOIAL RASP

## (undated) DEVICE — BLANKET UPPER BODY

## (undated) DEVICE — TRAY WET SKIN PREP PREMIUM

## (undated) DEVICE — HOOD T7 PLUS W/PEEL AWAY SHIELD

## (undated) DEVICE — SUTURE POLYSORB 1 UNDYED 1X30 GS-12

## (undated) DEVICE — SOLN IRRIG .9%SOD 3L

## (undated) DEVICE — BLADE SCALPEL #10

## (undated) DEVICE — HYDROGEN PEROXIDE BOTTLE 16-OZ

## (undated) DEVICE — NEEDLE DISP HYPO 18GX1-1/2IN

## (undated) DEVICE — DRAPE U/ SHT SPLIT PLASTIC ST 24/CS